# Patient Record
Sex: MALE | Race: WHITE | NOT HISPANIC OR LATINO | Employment: OTHER | URBAN - METROPOLITAN AREA
[De-identification: names, ages, dates, MRNs, and addresses within clinical notes are randomized per-mention and may not be internally consistent; named-entity substitution may affect disease eponyms.]

---

## 2018-06-15 ENCOUNTER — TELEPHONE (OUTPATIENT)
Dept: PRIMARY CARE CLINIC | Facility: CLINIC | Age: 58
End: 2018-06-15

## 2018-06-15 NOTE — TELEPHONE ENCOUNTER
----- Message from Shu Mantilla sent at 6/15/2018 12:46 PM CDT -----  Type: Requesting to speak with nurse    Name of Caller: Patient  Symptoms:  Spider bites  Best Call Back Number:  [phone]  Additional Information:   Patient requesting to speak with nurse concerning today's 3:00 scheduled appointment/has question/please call back to advise.

## 2018-06-22 ENCOUNTER — OFFICE VISIT (OUTPATIENT)
Dept: PRIMARY CARE CLINIC | Facility: CLINIC | Age: 58
End: 2018-06-22
Payer: MEDICARE

## 2018-06-22 VITALS
HEIGHT: 62 IN | OXYGEN SATURATION: 93 % | HEART RATE: 82 BPM | WEIGHT: 163.31 LBS | TEMPERATURE: 99 F | BODY MASS INDEX: 30.05 KG/M2 | RESPIRATION RATE: 18 BRPM | DIASTOLIC BLOOD PRESSURE: 85 MMHG | SYSTOLIC BLOOD PRESSURE: 157 MMHG

## 2018-06-22 DIAGNOSIS — J01.90 ACUTE NON-RECURRENT SINUSITIS, UNSPECIFIED LOCATION: Primary | ICD-10-CM

## 2018-06-22 DIAGNOSIS — J40 BRONCHITIS: ICD-10-CM

## 2018-06-22 PROCEDURE — 99213 OFFICE O/P EST LOW 20 MIN: CPT | Mod: S$PBB,,, | Performed by: INTERNAL MEDICINE

## 2018-06-22 PROCEDURE — 99213 OFFICE O/P EST LOW 20 MIN: CPT | Mod: PBBFAC,PN | Performed by: INTERNAL MEDICINE

## 2018-06-22 PROCEDURE — 99999 PR PBB SHADOW E&M-EST. PATIENT-LVL III: CPT | Mod: PBBFAC,,, | Performed by: INTERNAL MEDICINE

## 2018-06-22 RX ORDER — PROMETHAZINE HYDROCHLORIDE AND CODEINE PHOSPHATE 6.25; 1 MG/5ML; MG/5ML
5 SOLUTION ORAL EVERY 6 HOURS PRN
Qty: 120 ML | Refills: 0 | Status: SHIPPED | OUTPATIENT
Start: 2018-06-22 | End: 2018-07-02

## 2018-06-22 RX ORDER — DIAZEPAM 10 MG/1
10 TABLET ORAL 3 TIMES DAILY
Refills: 5 | COMMUNITY
Start: 2018-06-11

## 2018-06-22 RX ORDER — PREDNISONE 20 MG/1
20 TABLET ORAL 2 TIMES DAILY
Qty: 14 TABLET | Refills: 0 | Status: SHIPPED | OUTPATIENT
Start: 2018-06-22 | End: 2018-06-29

## 2018-06-22 RX ORDER — HYDROCODONE BITARTRATE AND ACETAMINOPHEN 5; 325 MG/1; MG/1
1 TABLET ORAL EVERY 6 HOURS PRN
Refills: 0 | COMMUNITY
Start: 2018-05-29 | End: 2018-07-18

## 2018-06-22 RX ORDER — AZITHROMYCIN 250 MG/1
TABLET, FILM COATED ORAL
Qty: 6 TABLET | Refills: 0 | Status: SHIPPED | OUTPATIENT
Start: 2018-06-22 | End: 2018-06-27

## 2018-06-22 RX ORDER — OXYCODONE AND ACETAMINOPHEN 10; 325 MG/1; MG/1
1 TABLET ORAL 3 TIMES DAILY
Refills: 0 | COMMUNITY
Start: 2018-06-19 | End: 2018-08-08

## 2018-06-24 NOTE — PROGRESS NOTES
Subjective:       Patient ID: Mike Craft is a 58 y.o. male.    Chief Complaint: Cough    HPI  Pt is here for bad cold coughing congesti sorethroat greenish phlegm has PCP for other med conditions and health maintenance take care of mom who is sick 24/7 at [facility] chronic back pain anxiety  Review of Systems    Objective:      Physical Exam   Constitutional: He is oriented to person, place, and time. He appears well-developed and well-nourished. No distress.   HENT:   Head: Normocephalic and atraumatic.   Right Ear: External ear normal.   Left Ear: External ear normal.   Mouth/Throat: Oropharynx is clear and moist. No oropharyngeal exudate.   Nasal congestion   Eyes: Conjunctivae and EOM are normal. Pupils are equal, round, and reactive to light. Right eye exhibits no discharge. Left eye exhibits no discharge.   Neck: Normal range of motion. Neck supple. No thyromegaly present.   Cardiovascular: Normal rate, regular rhythm, normal heart sounds and intact distal pulses.  Exam reveals no gallop and no friction rub.    No murmur heard.  Pulmonary/Chest: Effort normal. No respiratory distress. He has no wheezes. He has rales (mild bilateral ronchi). He exhibits no tenderness.   Abdominal: Soft. Bowel sounds are normal. He exhibits no distension. There is no tenderness. There is no rebound and no guarding.   Musculoskeletal: Normal range of motion. He exhibits no edema, tenderness or deformity.   Lymphadenopathy:     He has no cervical adenopathy.   Neurological: He is alert and oriented to person, place, and time.   Skin: Skin is warm and dry. Capillary refill takes less than 2 seconds. No rash noted. No erythema.   Psychiatric: He has a normal mood and affect. Judgment and thought content normal.   Nursing note and vitals reviewed.      Assessment:       1. Acute non-recurrent sinusitis, unspecified location    2. Bronchitis        Plan:       Acute non-recurrent sinusitis, unspecified location  -      azithromycin (Z-LIAT) 250 MG tablet; Take 2 tablets by mouth on day 1; Take 1 tablet by mouth on days 2-5  Dispense: 6 tablet; Refill: 0  -     predniSONE (DELTASONE) 20 MG tablet; Take 1 tablet (20 mg total) by mouth 2 (two) times daily. for 7 days  Dispense: 14 tablet; Refill: 0  -     promethazine-codeine 6.25-10 mg/5 ml (PHENERGAN WITH CODEINE) 6.25-10 mg/5 mL syrup; Take 5 mLs by mouth every 6 (six) hours as needed for Cough.  Dispense: 120 mL; Refill: 0    Bronchitis  -     azithromycin (Z-LIAT) 250 MG tablet; Take 2 tablets by mouth on day 1; Take 1 tablet by mouth on days 2-5  Dispense: 6 tablet; Refill: 0  -     predniSONE (DELTASONE) 20 MG tablet; Take 1 tablet (20 mg total) by mouth 2 (two) times daily. for 7 days  Dispense: 14 tablet; Refill: 0  -     promethazine-codeine 6.25-10 mg/5 ml (PHENERGAN WITH CODEINE) 6.25-10 mg/5 mL syrup; Take 5 mLs by mouth every 6 (six) hours as needed for Cough.  Dispense: 120 mL; Refill: 0

## 2018-07-24 PROBLEM — K22.70 BARRETT'S ESOPHAGUS: Status: ACTIVE | Noted: 2018-07-24

## 2018-11-08 ENCOUNTER — OFFICE VISIT (OUTPATIENT)
Dept: PRIMARY CARE CLINIC | Facility: CLINIC | Age: 58
End: 2018-11-08
Payer: MEDICARE

## 2018-11-08 VITALS
DIASTOLIC BLOOD PRESSURE: 76 MMHG | SYSTOLIC BLOOD PRESSURE: 132 MMHG | HEIGHT: 61 IN | RESPIRATION RATE: 18 BRPM | HEART RATE: 83 BPM | BODY MASS INDEX: 30.58 KG/M2 | WEIGHT: 162 LBS | OXYGEN SATURATION: 99 %

## 2018-11-08 DIAGNOSIS — K04.7 ABSCESSED TOOTH: Primary | ICD-10-CM

## 2018-11-08 DIAGNOSIS — J01.90 ACUTE NON-RECURRENT SINUSITIS, UNSPECIFIED LOCATION: ICD-10-CM

## 2018-11-08 DIAGNOSIS — R51.9 NONINTRACTABLE HEADACHE, UNSPECIFIED CHRONICITY PATTERN, UNSPECIFIED HEADACHE TYPE: ICD-10-CM

## 2018-11-08 PROCEDURE — 99213 OFFICE O/P EST LOW 20 MIN: CPT | Mod: S$PBB,,, | Performed by: INTERNAL MEDICINE

## 2018-11-08 PROCEDURE — 99213 OFFICE O/P EST LOW 20 MIN: CPT | Mod: PBBFAC,PN | Performed by: INTERNAL MEDICINE

## 2018-11-08 PROCEDURE — 99999 PR PBB SHADOW E&M-EST. PATIENT-LVL III: CPT | Mod: PBBFAC,,, | Performed by: INTERNAL MEDICINE

## 2018-11-08 RX ORDER — BUTALBITAL, ACETAMINOPHEN AND CAFFEINE 50; 325; 40 MG/1; MG/1; MG/1
1 TABLET ORAL EVERY 6 HOURS PRN
Qty: 40 TABLET | Refills: 0 | Status: SHIPPED | OUTPATIENT
Start: 2018-11-08 | End: 2018-12-08

## 2018-11-09 NOTE — PATIENT INSTRUCTIONS
Keep appointment with his dentist next week can see the clindamycin if the omentum not effective for tooth abscess

## 2018-11-09 NOTE — PROGRESS NOTES
Subjective:       Patient ID: Mike Craft is a 58 y.o. male.    Chief Complaint: Hospital Follow Up (teeth ) and Headache    HPI  patient was seen in ER few days ago for tooth abscess severe pain on right face given IV morphine and p.o. antibiotic Augmentin to take since last night fill feeling better but right face still with swelling erythematous in the lower eyelid spongy patient complained of migraine headaches on the right side request refills on his of his head  Review of Systems    Objective:      Physical Exam   Constitutional: He is oriented to person, place, and time. He appears well-developed and well-nourished. No distress.   HENT:   Head: Normocephalic and atraumatic.   Right Ear: External ear normal.   Left Ear: External ear normal.   Nose: Nose normal.   Mouth/Throat: Oropharynx is clear and moist. No oropharyngeal exudate.   Eyes: Conjunctivae and EOM are normal. Pupils are equal, round, and reactive to light. Right eye exhibits no discharge. Left eye exhibits no discharge.   Neck: Normal range of motion. Neck supple. No thyromegaly present.   Cardiovascular: Normal rate, regular rhythm, normal heart sounds and intact distal pulses. Exam reveals no gallop and no friction rub.   No murmur heard.  Pulmonary/Chest: Effort normal and breath sounds normal. No respiratory distress. He has no wheezes. He has no rales. He exhibits no tenderness.   Abdominal: Soft. Bowel sounds are normal. He exhibits no distension. There is no tenderness. There is no rebound and no guarding.   Musculoskeletal: Normal range of motion. He exhibits no edema, tenderness or deformity.   Lymphadenopathy:     He has no cervical adenopathy.   Neurological: He is alert and oriented to person, place, and time.   Skin: Skin is warm and dry. Capillary refill takes less than 2 seconds. No rash noted. No erythema.   Tenderness and swelling of the right maxillary sinuses and right lower eyelid   Psychiatric: He has a normal mood and  affect. Judgment and thought content normal.   Nursing note and vitals reviewed.      Assessment:       1. Abscessed tooth    2. Nonintractable headache, unspecified chronicity pattern, unspecified headache type    3. Acute non-recurrent sinusitis, unspecified location        Plan:       Abscessed tooth    Nonintractable headache, unspecified chronicity pattern, unspecified headache type  -     butalbital-acetaminophen-caffeine -40 mg (FIORICET, ESGIC) -40 mg per tablet; Take 1 tablet by mouth every 6 (six) hours as needed for Pain.  Dispense: 40 tablet; Refill: 0    Acute non-recurrent sinusitis, unspecified location

## 2018-12-21 PROBLEM — K21.00 ESOPHAGITIS, REFLUX: Status: ACTIVE | Noted: 2018-12-21

## 2019-01-31 ENCOUNTER — OFFICE VISIT (OUTPATIENT)
Dept: PRIMARY CARE CLINIC | Facility: CLINIC | Age: 59
End: 2019-01-31
Payer: MEDICARE

## 2019-01-31 VITALS
HEART RATE: 75 BPM | TEMPERATURE: 98 F | RESPIRATION RATE: 18 BRPM | DIASTOLIC BLOOD PRESSURE: 84 MMHG | OXYGEN SATURATION: 96 % | WEIGHT: 166.19 LBS | BODY MASS INDEX: 30.58 KG/M2 | SYSTOLIC BLOOD PRESSURE: 149 MMHG | HEIGHT: 62 IN

## 2019-01-31 DIAGNOSIS — J06.9 UPPER RESPIRATORY TRACT INFECTION, UNSPECIFIED TYPE: Primary | ICD-10-CM

## 2019-01-31 DIAGNOSIS — R05.9 COUGH: ICD-10-CM

## 2019-01-31 PROCEDURE — 99024 POSTOP FOLLOW-UP VISIT: CPT | Mod: POP,,, | Performed by: INTERNAL MEDICINE

## 2019-01-31 PROCEDURE — 99211 PR OFFICE/OUTPT VISIT, EST, LEVL I: ICD-10-PCS | Mod: S$PBB,,, | Performed by: INTERNAL MEDICINE

## 2019-01-31 PROCEDURE — 99999 PR PBB SHADOW E&M-EST. PATIENT-LVL III: CPT | Mod: PBBFAC,,, | Performed by: INTERNAL MEDICINE

## 2019-01-31 PROCEDURE — 99999 PR PBB SHADOW E&M-EST. PATIENT-LVL III: ICD-10-PCS | Mod: PBBFAC,,, | Performed by: INTERNAL MEDICINE

## 2019-01-31 PROCEDURE — 99024 PR POST-OP FOLLOW-UP VISIT: ICD-10-PCS | Mod: POP,,, | Performed by: INTERNAL MEDICINE

## 2019-01-31 PROCEDURE — 99211 OFF/OP EST MAY X REQ PHY/QHP: CPT | Mod: S$PBB,,, | Performed by: INTERNAL MEDICINE

## 2019-01-31 PROCEDURE — 99213 OFFICE O/P EST LOW 20 MIN: CPT | Mod: PBBFAC,PN | Performed by: INTERNAL MEDICINE

## 2022-06-29 PROBLEM — K85.90 PANCREATITIS: Status: ACTIVE | Noted: 2022-06-29

## 2022-06-29 PROBLEM — R10.9 ABDOMINAL PAIN: Status: ACTIVE | Noted: 2022-06-29

## 2022-07-04 ENCOUNTER — HOSPITAL ENCOUNTER (OUTPATIENT)
Facility: HOSPITAL | Age: 62
Discharge: HOME | End: 2022-07-05
Attending: EMERGENCY MEDICINE | Admitting: STUDENT IN AN ORGANIZED HEALTH CARE EDUCATION/TRAINING PROGRAM
Payer: MEDICARE

## 2022-07-04 DIAGNOSIS — R07.9 CHEST PAIN: ICD-10-CM

## 2022-07-04 DIAGNOSIS — R10.9 ABDOMINAL PAIN: ICD-10-CM

## 2022-07-04 PROBLEM — F14.10 COCAINE ABUSE: Status: ACTIVE | Noted: 2022-07-04

## 2022-07-04 PROBLEM — F10.20 ALCOHOLISM: Status: ACTIVE | Noted: 2022-07-04

## 2022-07-04 PROBLEM — I10 HYPERTENSION: Status: ACTIVE | Noted: 2020-05-14

## 2022-07-04 PROBLEM — B19.20 HEPATITIS C VIRUS INFECTION: Status: ACTIVE | Noted: 2020-05-14

## 2022-07-04 LAB
ALBUMIN SERPL BCP-MCNC: 3.9 G/DL (ref 3.5–5.2)
ALP SERPL-CCNC: 61 U/L (ref 55–135)
ALT SERPL W/O P-5'-P-CCNC: 48 U/L (ref 10–44)
ANION GAP SERPL CALC-SCNC: 8 MMOL/L (ref 8–16)
AST SERPL-CCNC: 17 U/L (ref 10–40)
BASOPHILS # BLD AUTO: 0.12 K/UL (ref 0–0.2)
BASOPHILS NFR BLD: 0.6 % (ref 0–1.9)
BILIRUB SERPL-MCNC: 0.4 MG/DL (ref 0.1–1)
BILIRUB UR QL STRIP: NEGATIVE
BUN SERPL-MCNC: 21 MG/DL (ref 8–23)
CALCIUM SERPL-MCNC: 9.2 MG/DL (ref 8.7–10.5)
CHLORIDE SERPL-SCNC: 107 MMOL/L (ref 95–110)
CLARITY UR REFRACT.AUTO: CLEAR
CO2 SERPL-SCNC: 24 MMOL/L (ref 23–29)
COLOR UR AUTO: YELLOW
CREAT SERPL-MCNC: 1.1 MG/DL (ref 0.5–1.4)
DIFFERENTIAL METHOD: ABNORMAL
EOSINOPHIL # BLD AUTO: 0 K/UL (ref 0–0.5)
EOSINOPHIL NFR BLD: 0 % (ref 0–8)
ERYTHROCYTE [DISTWIDTH] IN BLOOD BY AUTOMATED COUNT: 13.4 % (ref 11.5–14.5)
EST. GFR  (AFRICAN AMERICAN): >60 ML/MIN/1.73 M^2
EST. GFR  (NON AFRICAN AMERICAN): >60 ML/MIN/1.73 M^2
GLUCOSE SERPL-MCNC: 107 MG/DL (ref 70–110)
GLUCOSE UR QL STRIP: NEGATIVE
HCT VFR BLD AUTO: 52.2 % (ref 40–54)
HGB BLD-MCNC: 16.9 G/DL (ref 14–18)
HGB UR QL STRIP: ABNORMAL
IMM GRANULOCYTES # BLD AUTO: 0.71 K/UL (ref 0–0.04)
IMM GRANULOCYTES NFR BLD AUTO: 3.5 % (ref 0–0.5)
KETONES UR QL STRIP: NEGATIVE
LACTATE SERPL-SCNC: 1.7 MMOL/L (ref 0.5–2.2)
LEUKOCYTE ESTERASE UR QL STRIP: NEGATIVE
LIPASE SERPL-CCNC: 51 U/L (ref 4–60)
LYMPHOCYTES # BLD AUTO: 1.8 K/UL (ref 1–4.8)
LYMPHOCYTES NFR BLD: 9.1 % (ref 18–48)
MCH RBC QN AUTO: 28.7 PG (ref 27–31)
MCHC RBC AUTO-ENTMCNC: 32.4 G/DL (ref 32–36)
MCV RBC AUTO: 89 FL (ref 82–98)
MICROSCOPIC COMMENT: NORMAL
MONOCYTES # BLD AUTO: 1.1 K/UL (ref 0.3–1)
MONOCYTES NFR BLD: 5.3 % (ref 4–15)
NEUTROPHILS # BLD AUTO: 16.5 K/UL (ref 1.8–7.7)
NEUTROPHILS NFR BLD: 81.5 % (ref 38–73)
NITRITE UR QL STRIP: NEGATIVE
NRBC BLD-RTO: 0 /100 WBC
PH UR STRIP: 5 [PH] (ref 5–8)
PLATELET # BLD AUTO: 288 K/UL (ref 150–450)
PMV BLD AUTO: 10.4 FL (ref 9.2–12.9)
POTASSIUM SERPL-SCNC: 4.9 MMOL/L (ref 3.5–5.1)
PROT SERPL-MCNC: 7.1 G/DL (ref 6–8.4)
PROT UR QL STRIP: NEGATIVE
RBC # BLD AUTO: 5.88 M/UL (ref 4.6–6.2)
RBC #/AREA URNS AUTO: 1 /HPF (ref 0–4)
SODIUM SERPL-SCNC: 139 MMOL/L (ref 136–145)
SP GR UR STRIP: 1.02 (ref 1–1.03)
URN SPEC COLLECT METH UR: ABNORMAL
WBC # BLD AUTO: 20.28 K/UL (ref 3.9–12.7)
WBC #/AREA URNS AUTO: 0 /HPF (ref 0–5)

## 2022-07-04 PROCEDURE — 96372 THER/PROPH/DIAG INJ SC/IM: CPT | Mod: 59

## 2022-07-04 PROCEDURE — 81001 URINALYSIS AUTO W/SCOPE: CPT | Performed by: PHYSICIAN ASSISTANT

## 2022-07-04 PROCEDURE — 99285 EMERGENCY DEPT VISIT HI MDM: CPT | Mod: 25

## 2022-07-04 PROCEDURE — 87040 BLOOD CULTURE FOR BACTERIA: CPT | Mod: 59 | Performed by: STUDENT IN AN ORGANIZED HEALTH CARE EDUCATION/TRAINING PROGRAM

## 2022-07-04 PROCEDURE — 63600175 PHARM REV CODE 636 W HCPCS: Performed by: STUDENT IN AN ORGANIZED HEALTH CARE EDUCATION/TRAINING PROGRAM

## 2022-07-04 PROCEDURE — 25000003 PHARM REV CODE 250

## 2022-07-04 PROCEDURE — 85025 COMPLETE CBC W/AUTO DIFF WBC: CPT | Mod: 91 | Performed by: PHYSICIAN ASSISTANT

## 2022-07-04 PROCEDURE — S4991 NICOTINE PATCH NONLEGEND: HCPCS | Performed by: STUDENT IN AN ORGANIZED HEALTH CARE EDUCATION/TRAINING PROGRAM

## 2022-07-04 PROCEDURE — 83605 ASSAY OF LACTIC ACID: CPT | Performed by: PHYSICIAN ASSISTANT

## 2022-07-04 PROCEDURE — 99285 PR EMERGENCY DEPT VISIT,LEVEL V: ICD-10-PCS | Mod: ,,, | Performed by: PHYSICIAN ASSISTANT

## 2022-07-04 PROCEDURE — 96375 TX/PRO/DX INJ NEW DRUG ADDON: CPT

## 2022-07-04 PROCEDURE — 25500020 PHARM REV CODE 255: Performed by: STUDENT IN AN ORGANIZED HEALTH CARE EDUCATION/TRAINING PROGRAM

## 2022-07-04 PROCEDURE — G0378 HOSPITAL OBSERVATION PER HR: HCPCS

## 2022-07-04 PROCEDURE — 63600175 PHARM REV CODE 636 W HCPCS: Performed by: PHYSICIAN ASSISTANT

## 2022-07-04 PROCEDURE — 99223 1ST HOSP IP/OBS HIGH 75: CPT | Mod: AI,GC,, | Performed by: STUDENT IN AN ORGANIZED HEALTH CARE EDUCATION/TRAINING PROGRAM

## 2022-07-04 PROCEDURE — 25000003 PHARM REV CODE 250: Performed by: PHYSICIAN ASSISTANT

## 2022-07-04 PROCEDURE — 80053 COMPREHEN METABOLIC PANEL: CPT | Mod: 91 | Performed by: PHYSICIAN ASSISTANT

## 2022-07-04 PROCEDURE — 83690 ASSAY OF LIPASE: CPT | Mod: 91 | Performed by: PHYSICIAN ASSISTANT

## 2022-07-04 PROCEDURE — 96376 TX/PRO/DX INJ SAME DRUG ADON: CPT

## 2022-07-04 PROCEDURE — 63600175 PHARM REV CODE 636 W HCPCS

## 2022-07-04 PROCEDURE — 96365 THER/PROPH/DIAG IV INF INIT: CPT

## 2022-07-04 PROCEDURE — 99223 PR INITIAL HOSPITAL CARE,LEVL III: ICD-10-PCS | Mod: AI,GC,, | Performed by: STUDENT IN AN ORGANIZED HEALTH CARE EDUCATION/TRAINING PROGRAM

## 2022-07-04 PROCEDURE — 96361 HYDRATE IV INFUSION ADD-ON: CPT

## 2022-07-04 PROCEDURE — 99285 EMERGENCY DEPT VISIT HI MDM: CPT | Mod: ,,, | Performed by: PHYSICIAN ASSISTANT

## 2022-07-04 PROCEDURE — 25000003 PHARM REV CODE 250: Performed by: STUDENT IN AN ORGANIZED HEALTH CARE EDUCATION/TRAINING PROGRAM

## 2022-07-04 RX ORDER — AMLODIPINE BESYLATE 10 MG/1
10 TABLET ORAL DAILY
Status: DISCONTINUED | OUTPATIENT
Start: 2022-07-04 | End: 2022-07-04

## 2022-07-04 RX ORDER — HYDRALAZINE HYDROCHLORIDE 25 MG/1
25 TABLET, FILM COATED ORAL EVERY 6 HOURS PRN
Status: DISCONTINUED | OUTPATIENT
Start: 2022-07-04 | End: 2022-07-05 | Stop reason: HOSPADM

## 2022-07-04 RX ORDER — SODIUM CHLORIDE 0.9 % (FLUSH) 0.9 %
10 SYRINGE (ML) INJECTION EVERY 12 HOURS PRN
Status: DISCONTINUED | OUTPATIENT
Start: 2022-07-04 | End: 2022-07-05 | Stop reason: HOSPADM

## 2022-07-04 RX ORDER — OXYCODONE AND ACETAMINOPHEN 7.5; 325 MG/1; MG/1
1 TABLET ORAL EVERY 6 HOURS PRN
Status: DISCONTINUED | OUTPATIENT
Start: 2022-07-04 | End: 2022-07-05 | Stop reason: HOSPADM

## 2022-07-04 RX ORDER — TALC
6 POWDER (GRAM) TOPICAL NIGHTLY PRN
Status: DISCONTINUED | OUTPATIENT
Start: 2022-07-04 | End: 2022-07-05 | Stop reason: HOSPADM

## 2022-07-04 RX ORDER — IBUPROFEN 200 MG
16 TABLET ORAL
Status: DISCONTINUED | OUTPATIENT
Start: 2022-07-04 | End: 2022-07-05 | Stop reason: HOSPADM

## 2022-07-04 RX ORDER — TAMSULOSIN HYDROCHLORIDE 0.4 MG/1
0.4 CAPSULE ORAL DAILY
Status: DISCONTINUED | OUTPATIENT
Start: 2022-07-05 | End: 2022-07-05 | Stop reason: HOSPADM

## 2022-07-04 RX ORDER — DIPHENHYDRAMINE HYDROCHLORIDE 50 MG/ML
50 INJECTION INTRAMUSCULAR; INTRAVENOUS ONCE
Status: COMPLETED | OUTPATIENT
Start: 2022-07-04 | End: 2022-07-04

## 2022-07-04 RX ORDER — MORPHINE SULFATE 4 MG/ML
4 INJECTION, SOLUTION INTRAMUSCULAR; INTRAVENOUS
Status: COMPLETED | OUTPATIENT
Start: 2022-07-04 | End: 2022-07-04

## 2022-07-04 RX ORDER — DIAZEPAM 5 MG/1
10 TABLET ORAL EVERY 6 HOURS PRN
Status: DISCONTINUED | OUTPATIENT
Start: 2022-07-04 | End: 2022-07-05 | Stop reason: HOSPADM

## 2022-07-04 RX ORDER — PANTOPRAZOLE SODIUM 40 MG/1
40 TABLET, DELAYED RELEASE ORAL DAILY
Status: DISCONTINUED | OUTPATIENT
Start: 2022-07-05 | End: 2022-07-05 | Stop reason: HOSPADM

## 2022-07-04 RX ORDER — DIPHENHYDRAMINE HCL 50 MG
50 CAPSULE ORAL ONCE AS NEEDED
Status: DISCONTINUED | OUTPATIENT
Start: 2022-07-04 | End: 2022-07-05 | Stop reason: HOSPADM

## 2022-07-04 RX ORDER — GLUCAGON 1 MG
1 KIT INJECTION
Status: DISCONTINUED | OUTPATIENT
Start: 2022-07-04 | End: 2022-07-05 | Stop reason: HOSPADM

## 2022-07-04 RX ORDER — SUCRALFATE 1 G/10ML
1 SUSPENSION ORAL EVERY 6 HOURS
Status: DISCONTINUED | OUTPATIENT
Start: 2022-07-04 | End: 2022-07-05 | Stop reason: HOSPADM

## 2022-07-04 RX ORDER — AMLODIPINE BESYLATE 10 MG/1
10 TABLET ORAL DAILY
Status: DISCONTINUED | OUTPATIENT
Start: 2022-07-05 | End: 2022-07-04

## 2022-07-04 RX ORDER — ONDANSETRON 2 MG/ML
4 INJECTION INTRAMUSCULAR; INTRAVENOUS EVERY 8 HOURS PRN
Status: DISCONTINUED | OUTPATIENT
Start: 2022-07-04 | End: 2022-07-05 | Stop reason: HOSPADM

## 2022-07-04 RX ORDER — NICOTINE 7MG/24HR
1 PATCH, TRANSDERMAL 24 HOURS TRANSDERMAL DAILY
Status: DISCONTINUED | OUTPATIENT
Start: 2022-07-04 | End: 2022-07-05 | Stop reason: HOSPADM

## 2022-07-04 RX ORDER — NALOXONE HCL 0.4 MG/ML
0.02 VIAL (ML) INJECTION
Status: DISCONTINUED | OUTPATIENT
Start: 2022-07-04 | End: 2022-07-05 | Stop reason: HOSPADM

## 2022-07-04 RX ORDER — ONDANSETRON 2 MG/ML
4 INJECTION INTRAMUSCULAR; INTRAVENOUS
Status: COMPLETED | OUTPATIENT
Start: 2022-07-04 | End: 2022-07-04

## 2022-07-04 RX ORDER — ENOXAPARIN SODIUM 100 MG/ML
40 INJECTION SUBCUTANEOUS EVERY 24 HOURS
Status: DISCONTINUED | OUTPATIENT
Start: 2022-07-04 | End: 2022-07-05 | Stop reason: HOSPADM

## 2022-07-04 RX ORDER — MAG HYDROX/ALUMINUM HYD/SIMETH 200-200-20
30 SUSPENSION, ORAL (FINAL DOSE FORM) ORAL
Status: DISCONTINUED | OUTPATIENT
Start: 2022-07-04 | End: 2022-07-05 | Stop reason: HOSPADM

## 2022-07-04 RX ORDER — NIFEDIPINE 30 MG/1
90 TABLET, EXTENDED RELEASE ORAL DAILY
Status: DISCONTINUED | OUTPATIENT
Start: 2022-07-04 | End: 2022-07-05 | Stop reason: HOSPADM

## 2022-07-04 RX ORDER — IBUPROFEN 200 MG
24 TABLET ORAL
Status: DISCONTINUED | OUTPATIENT
Start: 2022-07-04 | End: 2022-07-05 | Stop reason: HOSPADM

## 2022-07-04 RX ORDER — NICOTINE 7MG/24HR
1 PATCH, TRANSDERMAL 24 HOURS TRANSDERMAL DAILY
Status: DISCONTINUED | OUTPATIENT
Start: 2022-07-05 | End: 2022-07-04

## 2022-07-04 RX ADMIN — SUCRALFATE 1 G: 1 SUSPENSION ORAL at 06:07

## 2022-07-04 RX ADMIN — ENOXAPARIN SODIUM 40 MG: 100 INJECTION SUBCUTANEOUS at 06:07

## 2022-07-04 RX ADMIN — MORPHINE SULFATE 4 MG: 4 INJECTION INTRAVENOUS at 11:07

## 2022-07-04 RX ADMIN — PIPERACILLIN SODIUM AND TAZOBACTAM SODIUM 4.5 G: 4; .5 INJECTION, POWDER, LYOPHILIZED, FOR SOLUTION INTRAVENOUS at 05:07

## 2022-07-04 RX ADMIN — DIPHENHYDRAMINE HYDROCHLORIDE 50 MG: 50 INJECTION, SOLUTION INTRAMUSCULAR; INTRAVENOUS at 09:07

## 2022-07-04 RX ADMIN — MORPHINE SULFATE 4 MG: 4 INJECTION INTRAVENOUS at 12:07

## 2022-07-04 RX ADMIN — OXYCODONE AND ACETAMINOPHEN 1 TABLET: 7.5; 325 TABLET ORAL at 07:07

## 2022-07-04 RX ADMIN — NIFEDIPINE 90 MG: 30 TABLET, FILM COATED, EXTENDED RELEASE ORAL at 06:07

## 2022-07-04 RX ADMIN — ONDANSETRON 4 MG: 2 INJECTION INTRAMUSCULAR; INTRAVENOUS at 11:07

## 2022-07-04 RX ADMIN — NICOTINE 1 PATCH: 7 PATCH TRANSDERMAL at 06:07

## 2022-07-04 RX ADMIN — HYDRALAZINE HYDROCHLORIDE 25 MG: 25 TABLET, FILM COATED ORAL at 06:07

## 2022-07-04 RX ADMIN — IOHEXOL 100 ML: 350 INJECTION, SOLUTION INTRAVENOUS at 10:07

## 2022-07-04 RX ADMIN — SODIUM CHLORIDE 1000 ML: 0.9 INJECTION, SOLUTION INTRAVENOUS at 11:07

## 2022-07-04 RX ADMIN — MORPHINE SULFATE 4 MG: 4 INJECTION INTRAVENOUS at 04:07

## 2022-07-04 NOTE — ED NOTES
"Pt is AAOx4. Pt states he has "a lesion on his pancreas." Pt reports leaving [facility] a couple of hours ago d/t not receiving pain medications and "hopped in an ambulance" and [place] [facility]. Pt states he's having a "biospy on Thursday and hopes he can be admitted today to save him the trip."    Patient identifiers for Mike Craft 62 y.o. male checked and correct.  Chief Complaint   Patient presents with    Abdominal Pain     Discharged from [facility] and is not happy with Rx pain medication.      Past Medical History:   Diagnosis Date    Anxiety     Craft's esophagus 2018    Bronchitis     Chronic pain     back and neck    Cocaine abuse     Depression     GERD (gastroesophageal reflux disease)     History of stomach ulcers     Hypertension     Kidney stones     Sinusitis      Allergies reported:   Review of patient's allergies indicates:   Allergen Reactions    Iodinated contrast media Hives    Contrast media        "

## 2022-07-04 NOTE — HPI
62-year-old male with history of hypertension, kidney stones, and chronic vague abdominal pain who presents with acute abdominal pain for 5 days. Patient notes the episodes are intermittent and unpredictable, sometimes waking him from his sleep. The pain will often radiate into his testicles and is associated with nausea and vomiting.  He was recently see for the same on 06/29/22 and diagnosed with pancreatitis and pyelonephritis and was given abx for this. CT at that time also showed a 3.1 hypoechoic mass in pancreas near uncinate for which he had GI follow up for. Of note, this was also seen on CT in 2014. Denies fevers, chills, chest pain, or any difficulty breathing.     At time of exam patient was found sleeping in no distress.

## 2022-07-04 NOTE — ED NOTES
ED Physician Hand-off Note:    ED Course: I assumed care of patient from off-going ED physician team. Briefly, Patient is a 62-year-old male with intermittent abdominal pain    At the time of signout plan was pending General surgery evaluation.    Medications given in the ED:    Medications   sodium chloride 0.9% bolus 1,000 mL (0 mLs Intravenous Stopped 7/4/22 1231)   morphine injection 4 mg (4 mg Intravenous Given 7/4/22 1115)   ondansetron injection 4 mg (4 mg Intravenous Given 7/4/22 1106)   morphine injection 4 mg (4 mg Intravenous Given 7/4/22 1228)   morphine injection 4 mg (4 mg Intravenous Given 7/4/22 1601)   piperacillin-tazobactam 4.5 g in sodium chloride 0.9% 100 mL IVPB (ready to mix system) (4.5 g Intravenous New Bag 7/4/22 1705)       Disposition:  Discussed plan with off going provider.  Plan to admit for intractable abdominal pain as well as worsening leukocytosis.  Discussed with general surgery who does not feel that this is biliary and does not recommend any surgical intervention.  Will continue with previous plan and admit to hospital medicine for further evaluation.    Patient comfortable with admission. Patient counseled regarding exam, results, diagnosis, treatment, and plan.    Impression:  Abdominal pain       Shayy Lobato PA-C  07/04/22 [id number]

## 2022-07-04 NOTE — Clinical Note
Diagnosis: Abdominal pain [633706]   Future Attending Provider: PRABHA BUTT [[id number]]   Admitting Provider:: PRABHA BUTT [[id number]]

## 2022-07-04 NOTE — ED PROVIDER NOTES
Encounter Date: 7/4/2022       History     Chief Complaint   Patient presents with    Abdominal Pain     Discharged from [facility] and is not happy with Rx pain medication.      This is a 62 y.o. year old male with a PMH of HTN, kidney stones, GERD who presents to the ED with a chief complaint of abdominal pain. Patient reports intermittent abdominal pain x 5 days. The current episode woke him up out of his sleep. The pain is described as mid abdominal squeezing pain with radiation to the back and testicles. Patient rates the pain 10/10. Associated symptoms include nausea and vomiting. He was seen at [facility] and did not improve, so he called EMS for transport here. He was admitted with pancreatitis and pyelonephritis on 6/29. Urine culture from that admission showed no growth. Lipase peaked at 139 and started trending down. CT and abdominal US showed an Ill-defined 3.1 cm hypoechoic mass in the pancreatic uncinate. Tiny gallbladder polyps versus adherent stones were also noted. He denies fever, chills, nasal congestion, cough, chest pain, shortness of breath. Surgical hx appendectomy. Patient denies known exposure to COVID-19 and is vaccinated.          Review of patient's allergies indicates:   Allergen Reactions    Iodinated contrast media Hives    Contrast media      Past Medical History:   Diagnosis Date    Anxiety     Craft's esophagus 2018    Bronchitis     Chronic pain     back and neck    Cocaine abuse     Depression     GERD (gastroesophageal reflux disease)     History of stomach ulcers     Hypertension     Kidney stones     Sinusitis      Past Surgical History:   Procedure Laterality Date    APPENDECTOMY      CERVICAL FUSION      titanium plate in neck c 4 5    ESOPHAGOGASTRODUODENOSCOPY N/A 7/24/2018    Procedure: EGD (ESOPHAGOGASTRODUODENOSCOPY);  Surgeon: Justo Barrow MD;  [facility];  Service: General;  Laterality: N/A;    ESOPHAGOGASTRODUODENOSCOPY  12/21/2018     ESOPHAGOGASTRODUODENOSCOPY N/A 12/21/2018    Procedure: EGD (ESOPHAGOGASTRODUODENOSCOPY);  Surgeon: Justo Barrow MD;  Location[facility];  Service: General;  Laterality: N/A;    FINGER TENDON REPAIR      lil finger lt hand    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     Family History   Problem Relation Age of Onset    Parkinsonism Mother     Alzheimer's disease Mother     Hypertension Mother     Hyperlipidemia Mother     Cancer Father         lung ca    Kidney disease Brother      Social History     Tobacco Use    Smoking status: Current Every Day Smoker     Packs/day: 1.00     Years: 30.00     Pack years: 30.00     Types: Cigarettes    Smokeless tobacco: Never Used   Substance Use Topics    Alcohol use: Not Currently     Alcohol/week: 1.0 standard drink     Types: 1 Cans of beer per week     Comment: six pack per weekend    Drug use: Not Currently     Review of Systems   Constitutional: Negative for chills and fever.   HENT: Negative for sore throat.    Respiratory: Negative for shortness of breath.    Cardiovascular: Negative for chest pain.   Gastrointestinal: Positive for abdominal pain, nausea and vomiting.   Genitourinary: Negative for dysuria.   Musculoskeletal: Positive for back pain.   Skin: Negative for rash.   Neurological: Negative for weakness.   Hematological: Does not bruise/bleed easily.       Physical Exam     Initial Vitals [07/04/22 1000]   BP Pulse Resp Temp SpO2   (!) 168/82 62 20 97.8 °F (36.6 °C) 97 %      MAP       --         Physical Exam    Constitutional: He appears well-developed and well-nourished. No distress.   HENT:   Head: Atraumatic.   Eyes: Conjunctivae and EOM are normal. Pupils are equal, round, and reactive to light.   Cardiovascular: Normal rate, regular rhythm and normal heart sounds.   Pulmonary/Chest: Breath sounds normal. No respiratory distress. He has no wheezes. He has no rhonchi. He has no rales.   Abdominal: Abdomen is soft and protuberant. Bowel  sounds are normal. There is generalized abdominal tenderness. There is no rebound and no guarding.     Neurological: He is alert and oriented to person, place, and time.   Skin: Skin is warm and dry. No rash noted.         ED Course   Procedures  Labs Reviewed   CBC W/ AUTO DIFFERENTIAL - Abnormal; Notable for the following components:       Result Value    WBC 20.28 (*)     Immature Granulocytes 3.5 (*)     Gran # (ANC) 16.5 (*)     Immature Grans (Abs) 0.71 (*)     Mono # 1.1 (*)     Gran % 81.5 (*)     Lymph % 9.1 (*)     All other components within normal limits   COMPREHENSIVE METABOLIC PANEL - Abnormal; Notable for the following components:    ALT 48 (*)     All other components within normal limits   URINALYSIS, REFLEX TO URINE CULTURE - Abnormal; Notable for the following components:    Occult Blood UA 1+ (*)     All other components within normal limits    Narrative:     Specimen Source->Urine   LIPASE   URINALYSIS MICROSCOPIC    Narrative:     Specimen Source->Urine          Imaging Results          US Abdomen Limited (Gallbladder) (Final result)  Result time 07/04/22 12:43:47    Final result by Dani Davey MD (07/04/22 12:43:47)                 Impression:      No detrimental change or acute sonographic abnormality.    Grossly stable ill-defined 2.4 cm hypoechoic mass in the pancreatic uncinate, which is indeterminate but has been present on CT dating back to at least 2014.    Few additional findings as above.      Electronically signed by: Dani Davey MD  Date:    07/04/2022  Time:    12:43             Narrative:    EXAMINATION:  US ABDOMEN LIMITED    CLINICAL HISTORY:  Unspecified abdominal pain    TECHNIQUE:  Limited ultrasound of the right upper quadrant of the abdomen.    COMPARISON:  Gallbladder ultrasound 06/29/2022, CT renal stone study 06/29/2022 and 11/09/2014    FINDINGS:  Gallbladder: No calculi, wall thickening, or pericholecystic fluid.  No sonographic Galvan's sign.    Biliary system:  The common duct is not dilated, measuring 2 mm.  No intrahepatic ductal dilatation.    Miscellaneous: Redemonstration of ill-defined hypoechoic mass in the region of the uncinate process measuring approximately 2.1 x 2 x 2.4 cm, not significantly changed allowing for inter observer variability, likely corresponding to the soft tissue mass identified on previous CT.  No upper abdominal ascites.  No right hydronephrosis.  1.9 cm simple cortical cyst at the interpolar region of the right kidney.  7 mm simple parenchymal cyst within the left hepatic lobe.  Echogenic hepatic parenchyma which could reflect steatosis and/or chronic liver disease.                                 Medications   morphine injection 4 mg (has no administration in time range)   sodium chloride 0.9% bolus 1,000 mL (0 mLs Intravenous Stopped 7/4/22 1231)   morphine injection 4 mg (4 mg Intravenous Given 7/4/22 1115)   ondansetron injection 4 mg (4 mg Intravenous Given 7/4/22 1106)   morphine injection 4 mg (4 mg Intravenous Given 7/4/22 1228)     Medical Decision Making:   History:   Old Medical Records: I decided to obtain old medical records.  Old Records Summarized: records from previous admission(s).  Clinical Tests:   Lab Tests: Ordered and Reviewed  Radiological Study: Ordered and Reviewed  Medical Tests: Ordered and Reviewed       APC / Resident Notes:   62 y.o. year old male presenting with abdominal pain.    DDx includes but is not limited to cholecystitis, cholangitis, pancreatitis, renal colic.    ED course  Labs with increased leukocytosis WBC 16> 20.28, LFTs are stable (ALT slightly elevated at 48), lipase 51. Urinalysis benign.     Abdominal US with no focal findings. No calculi, wall thickening, or pericholecystic fluid of the gallbladder.    Plan  General Surgery consulted, delay in recommendations as they are in OR  3:59 PM At time of shift change the care of this patient was signed over to the oncoming team pending surgery  recommendations. Anticipate admission for pain control.  I discussed the care of this patient with my supervising physician.                   Clinical Impression:   Final diagnoses:  [R10.9] Abdominal pain                 Yuliana Geronimo PA-C  07/04/22 1600

## 2022-07-04 NOTE — ASSESSMENT & PLAN NOTE
62-year-old male with multiple medical co-morbidities as well as chronic abdominal pain now with acute episode over last 5 days occurring intermittently. CT renal stone study and US not revealing. Pancreatic head mass seen which has been stable since 2014. Has a white count as well as u tox positive for cocaine, amphetamines, marijuana, benzos, and opiates. Abdominal pain is vague and workup thus far un-revealing aside from white count.     -no surgical source of abdominal pain thus far  -can get CT abdomen/pelvis with IV contrast to further delineate  -surgery will follow along, call if exam changes.

## 2022-07-04 NOTE — CONSULTS
[facility] - Emergency Dept  General Surgery  Consult Note    Patient Name: Mike Craft  MRN: [id number]  Code Status: Full Code  Admission Date: 7/4/2022  Hospital Length of Stay: 0 days  Attending Physician: Akin Vasquez MD  Primary Care Provider: Justo Barrow MD    Patient information was obtained from patient, past medical records, ER records and primary team.     Inpatient consult to General surgery  Consult performed by: Prasad Henderson MD  Consult ordered by: Yuliana Geronimo PA-C        Subjective:     Principal Problem: Abdominal pain    History of Present Illness: 62-year-old male with history of hypertension, kidney stones, and chronic vague abdominal pain who presents with acute abdominal pain for 5 days. Patient notes the episodes are intermittent and unpredictable, sometimes waking him from his sleep. The pain will often radiate into his testicles and is associated with nausea and vomiting.  He was recently see for the same on 06/29/22 and diagnosed with pancreatitis and pyelonephritis and was given abx for this. CT at that time also showed a 3.1 hypoechoic mass in pancreas near uncinate for which he had GI follow up for. Of note, this was also seen on CT in 2014. Denies fevers, chills, chest pain, or any difficulty breathing.     At time of exam patient was found sleeping in no distress.      Current Facility-Administered Medications on File Prior to Encounter   Medication    [COMPLETED] fentaNYL 50 mcg/mL injection 100 mcg    [COMPLETED] ketorolac injection 30 mg    lactated ringers infusion    [COMPLETED] metoclopramide HCl injection 10 mg    [COMPLETED] ondansetron injection 4 mg    [COMPLETED] sodium chloride 0.9% bolus 1,000 mL    sodium chloride 0.9% flush 3 mL    [DISCONTINUED] droperidoL injection 2.5 mg     Current Outpatient Medications on File Prior to Encounter   Medication Sig    ciprofloxacin HCl (CIPRO) 500 MG tablet Take 1 tablet (500 mg total) by mouth every 12  (twelve) hours. for 5 days    diazePAM (VALIUM) 10 MG Tab Take 10 mg by mouth 3 (three) times daily.    famotidine (PEPCID) 20 MG tablet Take 1 tablet (20 mg total) by mouth 2 (two) times daily. (Patient taking differently: Take 20 mg by mouth 2 (two) times daily as needed.)    metroNIDAZOLE (FLAGYL) 500 MG tablet Take 1 tablet (500 mg total) by mouth every 8 (eight) hours. for 5 days    ondansetron (ZOFRAN-ODT) 4 MG TbDL Take 1 tablet (4 mg total) by mouth 3 (three) times daily.    pantoprazole (PROTONIX) 40 MG tablet Take 1 tablet (40 mg total) by mouth once daily. (Patient not taking: Reported on 6/29/2022)    tamsulosin (FLOMAX) 0.4 mg Cap Take 1 capsule (0.4 mg total) by mouth once daily.    [DISCONTINUED] amLODIPine (NORVASC) 5 MG tablet Take 2 tablets (10 mg total) by mouth once daily. (Patient not taking: Reported on 6/29/2022)    [DISCONTINUED] levocetirizine (XYZAL) 5 MG tablet Take 1 tablet (5 mg total) by mouth once daily. (Patient not taking: Reported on 1/17/2022)       Review of patient's allergies indicates:   Allergen Reactions    Iodinated contrast media Hives    Contrast media        Past Medical History:   Diagnosis Date    Anxiety     Craft's esophagus 2018    Bronchitis     Chronic pain     back and neck    Cocaine abuse     Depression     GERD (gastroesophageal reflux disease)     History of stomach ulcers     Hypertension     Kidney stones     Sinusitis      Past Surgical History:   Procedure Laterality Date    APPENDECTOMY      CERVICAL FUSION      titanium plate in neck c 4 5    ESOPHAGOGASTRODUODENOSCOPY N/A 7/24/2018    Procedure: EGD (ESOPHAGOGASTRODUODENOSCOPY);  Surgeon: Justo Barrow MD;  [facility];  Service: General;  Laterality: N/A;    ESOPHAGOGASTRODUODENOSCOPY  12/21/2018    ESOPHAGOGASTRODUODENOSCOPY N/A 12/21/2018    Procedure: EGD (ESOPHAGOGASTRODUODENOSCOPY);  Surgeon: Justo Barrow MD;  Location: [facility];  Service:  General;  Laterality: N/A;    FINGER TENDON REPAIR      lil finger lt hand    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     Family History       Problem Relation (Age of Onset)    Alzheimer's disease Mother    Cancer Father    Hyperlipidemia Mother    Hypertension Mother    Kidney disease Brother    Parkinsonism Mother          Tobacco Use    Smoking status: Current Every Day Smoker     Packs/day: 1.00     Years: 30.00     Pack years: 30.00     Types: Cigarettes    Smokeless tobacco: Never Used   Substance and Sexual Activity    Alcohol use: Not Currently     Alcohol/week: 1.0 standard drink     Types: 1 Cans of beer per week     Comment: six pack per weekend    Drug use: Not Currently    Sexual activity: Yes     Partners: Female     Birth control/protection: None     Review of Systems   Constitutional:  Negative for chills and fever.   HENT:  Negative for sore throat.    Eyes:  Negative for redness.   Respiratory:  Negative for shortness of breath.    Cardiovascular:  Negative for chest pain.   Gastrointestinal:  Positive for abdominal pain, nausea and vomiting.   Endocrine: Negative for polyuria.   Genitourinary:  Negative for dysuria.   Musculoskeletal:  Negative for back pain.   Skin:  Negative for rash.   Neurological:  Negative for headaches.   Psychiatric/Behavioral:  Negative for agitation.    Objective:     Vital Signs (Most Recent):  Temp: 98.5 °F (36.9 °C) (07/04/22 1558)  Pulse: 73 (07/04/22 1723)  Resp: 16 (07/04/22 1723)  BP: (!) 194/89 (07/04/22 1723)  SpO2: 97 % (07/04/22 1558)   Vital Signs (24h Range):  Temp:  [97.8 °F (36.6 °C)-98.5 °F (36.9 °C)] 98.5 °F (36.9 °C)  Pulse:  [62-77] 73  Resp:  [16-20] 16  SpO2:  [95 %-99 %] 97 %  BP: (133-204)/() 194/89     Weight: 74.8 kg (165 lb)  Body mass index is 30.18 kg/m².    Physical Exam  Vitals and nursing note reviewed.   Constitutional:       General: He is not in acute distress.     Appearance: He is well-developed.   HENT:      Head:  Normocephalic and atraumatic.      Right Ear: External ear normal.      Left Ear: External ear normal.   Eyes:      Conjunctiva/sclera: Conjunctivae normal.   Cardiovascular:      Rate and Rhythm: Normal rate.   Pulmonary:      Effort: Pulmonary effort is normal.   Abdominal:      Palpations: Abdomen is soft.      Tenderness: There is no abdominal tenderness.      Comments: No real abdominal tenderness. No distension or tympany.    Musculoskeletal:         General: Normal range of motion.      Cervical back: Normal range of motion.   Skin:     General: Skin is warm and dry.   Neurological:      Mental Status: He is alert.   Psychiatric:         Behavior: Behavior normal.       Significant Labs:  I have reviewed all pertinent lab results within the past 24 hours.  CBC:   Recent Labs   Lab 07/04/22  1106   WBC 20.28*   RBC 5.88   HGB 16.9   HCT 52.2      MCV 89   MCH 28.7   MCHC 32.4     CMP:   Recent Labs   Lab 07/04/22  1106      CALCIUM 9.2   ALBUMIN 3.9   PROT 7.1      K 4.9   CO2 24      BUN 21   CREATININE 1.1   ALKPHOS 61   ALT 48*   AST 17   BILITOT 0.4       Significant Diagnostics:  US unimpressive, stable pancreatic mass dating back to 2014.       Assessment/Plan:     * Abdominal pain  62-year-old male with multiple medical co-morbidities as well as chronic abdominal pain now with acute episode over last 5 days occurring intermittently. CT renal stone study and US not revealing. Pancreatic head mass seen which has been stable since 2014. Has a white count as well as u tox positive for cocaine, amphetamines, marijuana, benzos, and opiates. Abdominal pain is vague and workup thus far un-revealing aside from white count.     -no surgical source of abdominal pain thus far  -can get CT abdomen/pelvis with IV contrast to further delineate  -surgery will follow along, call if exam changes.       VTE Risk Mitigation (From admission, onward)         Ordered     enoxaparin injection 40 mg   Daily         07/04/22 1758     IP VTE HIGH RISK PATIENT  Once         07/04/22 1758     Place sequential compression device  Until discontinued         07/04/22 1758                Thank you for your consult. I will follow-up with patient. Please contact us if you have any additional questions.    Prasad Henderson MD  General Surgery  [facility]

## 2022-07-04 NOTE — SUBJECTIVE & OBJECTIVE
Current Facility-Administered Medications on File Prior to Encounter   Medication    [COMPLETED] fentaNYL 50 mcg/mL injection 100 mcg    [COMPLETED] ketorolac injection 30 mg    lactated ringers infusion    [COMPLETED] metoclopramide HCl injection 10 mg    [COMPLETED] ondansetron injection 4 mg    [COMPLETED] sodium chloride 0.9% bolus 1,000 mL    sodium chloride 0.9% flush 3 mL    [DISCONTINUED] droperidoL injection 2.5 mg     Current Outpatient Medications on File Prior to Encounter   Medication Sig    ciprofloxacin HCl (CIPRO) 500 MG tablet Take 1 tablet (500 mg total) by mouth every 12 (twelve) hours. for 5 days    diazePAM (VALIUM) 10 MG Tab Take 10 mg by mouth 3 (three) times daily.    famotidine (PEPCID) 20 MG tablet Take 1 tablet (20 mg total) by mouth 2 (two) times daily. (Patient taking differently: Take 20 mg by mouth 2 (two) times daily as needed.)    metroNIDAZOLE (FLAGYL) 500 MG tablet Take 1 tablet (500 mg total) by mouth every 8 (eight) hours. for 5 days    ondansetron (ZOFRAN-ODT) 4 MG TbDL Take 1 tablet (4 mg total) by mouth 3 (three) times daily.    pantoprazole (PROTONIX) 40 MG tablet Take 1 tablet (40 mg total) by mouth once daily. (Patient not taking: Reported on 6/29/2022)    tamsulosin (FLOMAX) 0.4 mg Cap Take 1 capsule (0.4 mg total) by mouth once daily.    [DISCONTINUED] amLODIPine (NORVASC) 5 MG tablet Take 2 tablets (10 mg total) by mouth once daily. (Patient not taking: Reported on 6/29/2022)    [DISCONTINUED] levocetirizine (XYZAL) 5 MG tablet Take 1 tablet (5 mg total) by mouth once daily. (Patient not taking: Reported on 1/17/2022)       Review of patient's allergies indicates:   Allergen Reactions    Iodinated contrast media Hives    Contrast media        Past Medical History:   Diagnosis Date    Anxiety     Craft's esophagus 2018    Bronchitis     Chronic pain     back and neck    Cocaine abuse     Depression     GERD (gastroesophageal reflux disease)     History of stomach ulcers      Hypertension     Kidney stones     Sinusitis      Past Surgical History:   Procedure Laterality Date    APPENDECTOMY      CERVICAL FUSION      titanium plate in neck c 4 5    ESOPHAGOGASTRODUODENOSCOPY N/A 7/24/2018    Procedure: EGD (ESOPHAGOGASTRODUODENOSCOPY);  Surgeon: Justo Barrow MD;  Location: [facility] END[facility];  Service: General;  Laterality: N/A;    ESOPHAGOGASTRODUODENOSCOPY  12/21/2018    ESOPHAGOGASTRODUODENOSCOPY N/A 12/21/2018    Procedure: EGD (ESOPHAGOGASTRODUODENOSCOPY);  Surgeon: Justo Barrow MD;  Location: [facility] END[facility];  Service: General;  Laterality: N/A;    FINGER TENDON REPAIR      lil finger lt hand    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     Family History       Problem Relation (Age of Onset)    Alzheimer's disease Mother    Cancer Father    Hyperlipidemia Mother    Hypertension Mother    Kidney disease Brother    Parkinsonism Mother          Tobacco Use    Smoking status: Current Every Day Smoker     Packs/day: 1.00     Years: 30.00     Pack years: 30.00     Types: Cigarettes    Smokeless tobacco: Never Used   Substance and Sexual Activity    Alcohol use: Not Currently     Alcohol/week: 1.0 standard drink     Types: 1 Cans of beer per week     Comment: six pack per weekend    Drug use: Not Currently    Sexual activity: Yes     Partners: Female     Birth control/protection: None     Review of Systems   Constitutional:  Negative for chills and fever.   HENT:  Negative for sore throat.    Eyes:  Negative for redness.   Respiratory:  Negative for shortness of breath.    Cardiovascular:  Negative for chest pain.   Gastrointestinal:  Positive for abdominal pain, nausea and vomiting.   Endocrine: Negative for polyuria.   Genitourinary:  Negative for dysuria.   Musculoskeletal:  Negative for back pain.   Skin:  Negative for rash.   Neurological:  Negative for headaches.   Psychiatric/Behavioral:  Negative for agitation.    Objective:     Vital Signs (Most Recent):  Temp: 98.5 °F (36.9  °C) (07/04/22 1558)  Pulse: 73 (07/04/22 1723)  Resp: 16 (07/04/22 1723)  BP: (!) 194/89 (07/04/22 1723)  SpO2: 97 % (07/04/22 1558)   Vital Signs (24h Range):  Temp:  [97.8 °F (36.6 °C)-98.5 °F (36.9 °C)] 98.5 °F (36.9 °C)  Pulse:  [62-77] 73  Resp:  [16-20] 16  SpO2:  [95 %-99 %] 97 %  BP: (133-204)/() 194/89     Weight: 74.8 kg (165 lb)  Body mass index is 30.18 kg/m².    Physical Exam  Vitals and nursing note reviewed.   Constitutional:       General: He is not in acute distress.     Appearance: He is well-developed.   HENT:      Head: Normocephalic and atraumatic.      Right Ear: External ear normal.      Left Ear: External ear normal.   Eyes:      Conjunctiva/sclera: Conjunctivae normal.   Cardiovascular:      Rate and Rhythm: Normal rate.   Pulmonary:      Effort: Pulmonary effort is normal.   Abdominal:      Palpations: Abdomen is soft.      Tenderness: There is no abdominal tenderness.      Comments: No real abdominal tenderness. No distension or tympany.    Musculoskeletal:         General: Normal range of motion.      Cervical back: Normal range of motion.   Skin:     General: Skin is warm and dry.   Neurological:      Mental Status: He is alert.   Psychiatric:         Behavior: Behavior normal.       Significant Labs:  I have reviewed all pertinent lab results within the past 24 hours.  CBC:   Recent Labs   Lab 07/04/22  1106   WBC 20.28*   RBC 5.88   HGB 16.9   HCT 52.2      MCV 89   MCH 28.7   MCHC 32.4     CMP:   Recent Labs   Lab 07/04/22  1106      CALCIUM 9.2   ALBUMIN 3.9   PROT 7.1      K 4.9   CO2 24      BUN 21   CREATININE 1.1   ALKPHOS 61   ALT 48*   AST 17   BILITOT 0.4       Significant Diagnostics:  US unimpressive, stable pancreatic mass dating back to 2014.

## 2022-07-05 VITALS
HEIGHT: 62 IN | WEIGHT: 165 LBS | BODY MASS INDEX: 30.36 KG/M2 | HEART RATE: 70 BPM | DIASTOLIC BLOOD PRESSURE: 72 MMHG | OXYGEN SATURATION: 99 % | RESPIRATION RATE: 18 BRPM | TEMPERATURE: 98 F | SYSTOLIC BLOOD PRESSURE: 115 MMHG

## 2022-07-05 LAB
ALBUMIN SERPL BCP-MCNC: 3.1 G/DL (ref 3.5–5.2)
ALP SERPL-CCNC: 49 U/L (ref 55–135)
ALT SERPL W/O P-5'-P-CCNC: 31 U/L (ref 10–44)
ANION GAP SERPL CALC-SCNC: 10 MMOL/L (ref 8–16)
AST SERPL-CCNC: 14 U/L (ref 10–40)
BASOPHILS # BLD AUTO: 0.13 K/UL (ref 0–0.2)
BASOPHILS NFR BLD: 0.7 % (ref 0–1.9)
BILIRUB SERPL-MCNC: 0.9 MG/DL (ref 0.1–1)
BUN SERPL-MCNC: 16 MG/DL (ref 8–23)
CALCIUM SERPL-MCNC: 7.8 MG/DL (ref 8.7–10.5)
CHLORIDE SERPL-SCNC: 107 MMOL/L (ref 95–110)
CO2 SERPL-SCNC: 22 MMOL/L (ref 23–29)
CREAT SERPL-MCNC: 1 MG/DL (ref 0.5–1.4)
DIFFERENTIAL METHOD: ABNORMAL
EOSINOPHIL # BLD AUTO: 0 K/UL (ref 0–0.5)
EOSINOPHIL NFR BLD: 0.1 % (ref 0–8)
ERYTHROCYTE [DISTWIDTH] IN BLOOD BY AUTOMATED COUNT: 13.4 % (ref 11.5–14.5)
EST. GFR  (AFRICAN AMERICAN): >60 ML/MIN/1.73 M^2
EST. GFR  (NON AFRICAN AMERICAN): >60 ML/MIN/1.73 M^2
GLUCOSE SERPL-MCNC: 94 MG/DL (ref 70–110)
HCT VFR BLD AUTO: 49 % (ref 40–54)
HGB BLD-MCNC: 15.9 G/DL (ref 14–18)
IMM GRANULOCYTES # BLD AUTO: 0.52 K/UL (ref 0–0.04)
IMM GRANULOCYTES NFR BLD AUTO: 2.8 % (ref 0–0.5)
LYMPHOCYTES # BLD AUTO: 2.2 K/UL (ref 1–4.8)
LYMPHOCYTES NFR BLD: 11.8 % (ref 18–48)
MAGNESIUM SERPL-MCNC: 2 MG/DL (ref 1.6–2.6)
MCH RBC QN AUTO: 28.5 PG (ref 27–31)
MCHC RBC AUTO-ENTMCNC: 32.4 G/DL (ref 32–36)
MCV RBC AUTO: 88 FL (ref 82–98)
MONOCYTES # BLD AUTO: 0.9 K/UL (ref 0.3–1)
MONOCYTES NFR BLD: 4.7 % (ref 4–15)
NEUTROPHILS # BLD AUTO: 14.9 K/UL (ref 1.8–7.7)
NEUTROPHILS NFR BLD: 79.9 % (ref 38–73)
NRBC BLD-RTO: 0 /100 WBC
PHOSPHATE SERPL-MCNC: 4.2 MG/DL (ref 2.7–4.5)
PLATELET # BLD AUTO: 276 K/UL (ref 150–450)
PMV BLD AUTO: 10.2 FL (ref 9.2–12.9)
POTASSIUM SERPL-SCNC: 4.4 MMOL/L (ref 3.5–5.1)
PROT SERPL-MCNC: 5.7 G/DL (ref 6–8.4)
RBC # BLD AUTO: 5.57 M/UL (ref 4.6–6.2)
SODIUM SERPL-SCNC: 139 MMOL/L (ref 136–145)
WBC # BLD AUTO: 18.62 K/UL (ref 3.9–12.7)

## 2022-07-05 PROCEDURE — 83735 ASSAY OF MAGNESIUM: CPT

## 2022-07-05 PROCEDURE — S4991 NICOTINE PATCH NONLEGEND: HCPCS | Performed by: STUDENT IN AN ORGANIZED HEALTH CARE EDUCATION/TRAINING PROGRAM

## 2022-07-05 PROCEDURE — 25000003 PHARM REV CODE 250

## 2022-07-05 PROCEDURE — 85025 COMPLETE CBC W/AUTO DIFF WBC: CPT

## 2022-07-05 PROCEDURE — 99239 HOSP IP/OBS DSCHRG MGMT >30: CPT | Mod: GC,,, | Performed by: STUDENT IN AN ORGANIZED HEALTH CARE EDUCATION/TRAINING PROGRAM

## 2022-07-05 PROCEDURE — 25000003 PHARM REV CODE 250: Performed by: STUDENT IN AN ORGANIZED HEALTH CARE EDUCATION/TRAINING PROGRAM

## 2022-07-05 PROCEDURE — 96366 THER/PROPH/DIAG IV INF ADDON: CPT

## 2022-07-05 PROCEDURE — 84100 ASSAY OF PHOSPHORUS: CPT

## 2022-07-05 PROCEDURE — 80053 COMPREHEN METABOLIC PANEL: CPT

## 2022-07-05 PROCEDURE — 99239 PR HOSPITAL DISCHARGE DAY,>30 MIN: ICD-10-PCS | Mod: GC,,, | Performed by: STUDENT IN AN ORGANIZED HEALTH CARE EDUCATION/TRAINING PROGRAM

## 2022-07-05 PROCEDURE — 63600175 PHARM REV CODE 636 W HCPCS: Performed by: STUDENT IN AN ORGANIZED HEALTH CARE EDUCATION/TRAINING PROGRAM

## 2022-07-05 PROCEDURE — G0378 HOSPITAL OBSERVATION PER HR: HCPCS

## 2022-07-05 RX ORDER — NIFEDIPINE 90 MG/1
90 TABLET, EXTENDED RELEASE ORAL DAILY
Qty: 30 TABLET | Refills: 11 | Status: SHIPPED | OUTPATIENT
Start: 2022-07-06 | End: 2023-02-08

## 2022-07-05 RX ORDER — POLYETHYLENE GLYCOL 3350 17 G/17G
17 POWDER, FOR SOLUTION ORAL 3 TIMES DAILY
Qty: 510 G | Refills: 0 | Status: SHIPPED | OUTPATIENT
Start: 2022-07-05

## 2022-07-05 RX ORDER — FAMOTIDINE 40 MG/1
40 TABLET, FILM COATED ORAL DAILY
COMMUNITY

## 2022-07-05 RX ORDER — OXYCODONE AND ACETAMINOPHEN 7.5; 325 MG/1; MG/1
1 TABLET ORAL
COMMUNITY

## 2022-07-05 RX ORDER — POLYETHYLENE GLYCOL 3350 17 G/17G
17 POWDER, FOR SOLUTION ORAL 3 TIMES DAILY
Status: DISCONTINUED | OUTPATIENT
Start: 2022-07-05 | End: 2022-07-05 | Stop reason: HOSPADM

## 2022-07-05 RX ADMIN — NICOTINE 1 PATCH: 7 PATCH TRANSDERMAL at 09:07

## 2022-07-05 RX ADMIN — DIAZEPAM 10 MG: 5 TABLET ORAL at 10:07

## 2022-07-05 RX ADMIN — NIFEDIPINE 90 MG: 30 TABLET, FILM COATED, EXTENDED RELEASE ORAL at 09:07

## 2022-07-05 RX ADMIN — PIPERACILLIN SODIUM AND TAZOBACTAM SODIUM 4.5 G: 4; .5 INJECTION, POWDER, LYOPHILIZED, FOR SOLUTION INTRAVENOUS at 09:07

## 2022-07-05 RX ADMIN — POLYETHYLENE GLYCOL 3350 17 G: 17 POWDER, FOR SOLUTION ORAL at 10:07

## 2022-07-05 RX ADMIN — OXYCODONE AND ACETAMINOPHEN 1 TABLET: 7.5; 325 TABLET ORAL at 06:07

## 2022-07-05 RX ADMIN — SUCRALFATE 1 G: 1 SUSPENSION ORAL at 06:07

## 2022-07-05 RX ADMIN — ALUMINUM HYDROXIDE, MAGNESIUM HYDROXIDE, AND SIMETHICONE 30 ML: 200; 200; 20 SUSPENSION ORAL at 06:07

## 2022-07-05 RX ADMIN — SUCRALFATE 1 G: 1 SUSPENSION ORAL at 12:07

## 2022-07-05 RX ADMIN — PANTOPRAZOLE SODIUM 40 MG: 40 TABLET, DELAYED RELEASE ORAL at 09:07

## 2022-07-05 RX ADMIN — ALUMINUM HYDROXIDE, MAGNESIUM HYDROXIDE, AND SIMETHICONE 30 ML: 200; 200; 20 SUSPENSION ORAL at 09:07

## 2022-07-05 RX ADMIN — PIPERACILLIN SODIUM AND TAZOBACTAM SODIUM 4.5 G: 4; .5 INJECTION, POWDER, LYOPHILIZED, FOR SOLUTION INTRAVENOUS at 12:07

## 2022-07-05 RX ADMIN — OXYCODONE AND ACETAMINOPHEN 1 TABLET: 7.5; 325 TABLET ORAL at 12:07

## 2022-07-05 RX ADMIN — TAMSULOSIN HYDROCHLORIDE 0.4 MG: 0.4 CAPSULE ORAL at 09:07

## 2022-07-05 NOTE — ED NOTES
Received report from PAPO Meyer; assumed care of pt.      Pt is resting comfortably on stretcher. NAD. Pt is aware of POC and denies further needs at this time. Bed locked and low, SR x 2, call light in reach.    Adult Physical Assessment  LOC: Mike Craft, 62 y.o. male verified via two identifiers.  The patient is awake, alert, oriented and speaking appropriately at this time.  APPEARANCE: Patient resting comfortably and appears to be in no acute distress at this time. Patient is clean and well groomed, patient's clothing is properly fastened.  SKIN:The skin is warm and dry, color consistent with ethnicity, patient has normal skin turgor and moist mucus membranes, skin intact, no breakdown or brusing noted.  MUSCULOSKELETAL: Patient moving all extremities well, no obvious swelling or deformities noted.  RESPIRATORY: Airway is open and patent, respirations are spontaneous, patient has a normal effort and rate, no accessory muscle use noted.  CARDIAC: Patient has a normal rate and rhythm, no periphreal edema noted in any extremity, capillary refill < 3 seconds in all extremities.  ABDOMEN: Soft, but tender to palpation, no abdominal distention noted. Bowel sounds present in all four quadrants.   NEUROLOGIC: Eyes open spontaneously, behavior appropriate to situation, follows commands, facial expression symmetrical, bilateral hand grasp equal and even, purposeful motor response noted, normal sensation in all extremities when touched with a finger.
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
## 2022-07-05 NOTE — SUBJECTIVE & OBJECTIVE
Past Medical History:   Diagnosis Date    Anxiety     Craft's esophagus 2018    Bronchitis     Chronic pain     back and neck    Cocaine abuse     Depression     GERD (gastroesophageal reflux disease)     History of stomach ulcers     Hypertension     Kidney stones     Sinusitis        Past Surgical History:   Procedure Laterality Date    APPENDECTOMY      CERVICAL FUSION      titanium plate in neck c 4 5    ESOPHAGOGASTRODUODENOSCOPY N/A 7/24/2018    Procedure: EGD (ESOPHAGOGASTRODUODENOSCOPY);  Surgeon: Justo Barrow MD;  Location: [facility] ENDO;  Service: General;  Laterality: N/A;    ESOPHAGOGASTRODUODENOSCOPY  12/21/2018    ESOPHAGOGASTRODUODENOSCOPY N/A 12/21/2018    Procedure: EGD (ESOPHAGOGASTRODUODENOSCOPY);  Surgeon: Justo Barrow MD;  Location: [facility] ENDO;  Service: General;  Laterality: N/A;    FINGER TENDON REPAIR      lil finger lt hand    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       Review of patient's allergies indicates:   Allergen Reactions    Iodinated contrast media Hives    Contrast media        Current Facility-Administered Medications on File Prior to Encounter   Medication    [COMPLETED] fentaNYL 50 mcg/mL injection 100 mcg    [COMPLETED] ketorolac injection 30 mg    lactated ringers infusion    [COMPLETED] metoclopramide HCl injection 10 mg    [COMPLETED] ondansetron injection 4 mg    [COMPLETED] sodium chloride 0.9% bolus 1,000 mL    sodium chloride 0.9% flush 3 mL    [DISCONTINUED] droperidoL injection 2.5 mg     Current Outpatient Medications on File Prior to Encounter   Medication Sig    ciprofloxacin HCl (CIPRO) 500 MG tablet Take 1 tablet (500 mg total) by mouth every 12 (twelve) hours. for 5 days    diazePAM (VALIUM) 10 MG Tab Take 10 mg by mouth 3 (three) times daily.    famotidine (PEPCID) 20 MG tablet Take 1 tablet (20 mg total) by mouth 2 (two) times daily. (Patient taking differently: Take 20 mg by mouth 2 (two) times daily as needed.)    metroNIDAZOLE (FLAGYL) 500  MG tablet Take 1 tablet (500 mg total) by mouth every 8 (eight) hours. for 5 days    ondansetron (ZOFRAN-ODT) 4 MG TbDL Take 1 tablet (4 mg total) by mouth 3 (three) times daily.    pantoprazole (PROTONIX) 40 MG tablet Take 1 tablet (40 mg total) by mouth once daily. (Patient not taking: Reported on 6/29/2022)    tamsulosin (FLOMAX) 0.4 mg Cap Take 1 capsule (0.4 mg total) by mouth once daily.    [DISCONTINUED] amLODIPine (NORVASC) 5 MG tablet Take 2 tablets (10 mg total) by mouth once daily. (Patient not taking: Reported on 6/29/2022)    [DISCONTINUED] levocetirizine (XYZAL) 5 MG tablet Take 1 tablet (5 mg total) by mouth once daily. (Patient not taking: Reported on 1/17/2022)     Family History       Problem Relation (Age of Onset)    Alzheimer's disease Mother    Cancer Father    Hyperlipidemia Mother    Hypertension Mother    Kidney disease Brother    Parkinsonism Mother          Tobacco Use    Smoking status: Current Every Day Smoker     Packs/day: 1.00     Years: 30.00     Pack years: 30.00     Types: Cigarettes    Smokeless tobacco: Never Used   Substance and Sexual Activity    Alcohol use: Not Currently     Alcohol/week: 1.0 standard drink     Types: 1 Cans of beer per week     Comment: six pack per weekend    Drug use: Not Currently    Sexual activity: Yes     Partners: Female     Birth control/protection: None     Review of Systems   Constitutional:  Negative for appetite change, chills and fever.   HENT:  Negative for congestion.    Eyes:  Negative for photophobia.   Respiratory:  Negative for cough and shortness of breath.    Cardiovascular:  Negative for chest pain and leg swelling.   Gastrointestinal:  Positive for abdominal distention, abdominal pain and vomiting. Negative for nausea.   Genitourinary:  Positive for flank pain. Negative for dysuria.   Musculoskeletal:  Negative for joint swelling.   Neurological:  Negative for headaches.   Psychiatric/Behavioral:  Negative for agitation and confusion.     Objective:     Vital Signs (Most Recent):  Temp: 98.5 °F (36.9 °C) (07/04/22 1558)  Pulse: 73 (07/04/22 1723)  Resp: 16 (07/04/22 1723)  BP: (!) 194/89 (07/04/22 1723)  SpO2: 97 % (07/04/22 1558)   Vital Signs (24h Range):  Temp:  [97.8 °F (36.6 °C)-98.5 °F (36.9 °C)] 98.5 °F (36.9 °C)  Pulse:  [62-77] 73  Resp:  [16-20] 16  SpO2:  [95 %-99 %] 97 %  BP: (133-204)/() 194/89     Weight: 74.8 kg (165 lb)  Body mass index is 30.18 kg/m².    Physical Exam  Constitutional:       Appearance: He is not ill-appearing.   HENT:      Head: Normocephalic and atraumatic.      Right Ear: External ear normal.      Left Ear: External ear normal.      Nose: Nose normal.      Mouth/Throat:      Mouth: Mucous membranes are moist.   Eyes:      General: No scleral icterus.     Extraocular Movements: Extraocular movements intact.      Conjunctiva/sclera: Conjunctivae normal.   Cardiovascular:      Rate and Rhythm: Normal rate and regular rhythm.   Pulmonary:      Effort: Pulmonary effort is normal.      Breath sounds: Normal breath sounds. No wheezing or rales.   Abdominal:      General: There is distension.      Tenderness: There is abdominal tenderness.      Comments: Tenderness to deep palpation on right side   Musculoskeletal:         General: No swelling. Normal range of motion.      Cervical back: Normal range of motion.   Skin:     General: Skin is warm and dry.   Neurological:      General: No focal deficit present.      Mental Status: He is alert.   Psychiatric:         Mood and Affect: Mood normal.      Comments: Tearful on interview           Significant Labs: All pertinent labs within the past 24 hours have been reviewed.  Recent Lab Results  (Last 5 results in the past 24 hours)        07/04/22  1650   07/04/22  1123   07/04/22  1106   07/04/22  0346   07/04/22  0248        Benzodiazepines       Positive         Phencyclidine       Negative         Tricyclic Antidepressants (TCA), Urine       Negative          OXYCODONE       Negative         PROPOXYPHENE       Positive         MDMA- ECSTASY       Negative         Albumin     3.9           Alkaline Phosphatase     61           ALT     48           Amphetamine Screen, Ur       Positive         Anion Gap     8           Appearance, UA   Clear     Clear         AST     17           Barbiturate Screen, Ur       Negative         Baso #     0.12           Basophil %     0.6           Bilirubin (UA)   Negative     Negative         BILIRUBIN TOTAL     0.4  Comment: For infants and newborns, interpretation of results should be based  on gestational age, weight and in agreement with clinical  observations.    Premature Infant recommended reference ranges:  Up to 24 hours.............<8.0 mg/dL  Up to 48 hours............<12.0 mg/dL  3-5 days..................<15.0 mg/dL  6-29 days.................<15.0 mg/dL             BUN     21           Calcium     9.2           Chloride     107           CO2     24           Cocaine (Metab.)       Positive         Color, UA   Yellow     Yellow         Creatinine     1.1           Differential Method     Automated           eGFR if      >60.0           eGFR if non      >60.0  Comment: Calculation used to obtain the estimated glomerular filtration  rate (eGFR) is the CKD-EPI equation.              Eos #     0.0           Eosinophil %     0.0           Glucose     107           Glucose, UA   Negative     Negative         Gran # (ANC)     16.5           Gran %     81.5           Hematocrit     52.2           Hemoglobin     16.9           Immature Grans (Abs)     0.71  Comment: Mild elevation in immature granulocytes is non specific and   can be seen in a variety of conditions including stress response,   acute inflammation, trauma and pregnancy. Correlation with other   laboratory and clinical findings is essential.             Immature Granulocytes     3.5           Ketones, UA   Negative     Negative          Lactate, John 1.7  Comment: Falsely low lactic acid results can be found in samples   containing >=13.0 mg/dL total bilirubin and/or >=3.5 mg/dL   direct bilirubin.                 Leukocytes, UA   Negative     Negative         Lipase     51           Lymph #     1.8           Lymph %     9.1           MCH     28.7           MCHC     32.4           MCV     89           Microscopic Comment   SEE COMMENT  Comment: Other formed elements not mentioned in the report are not   present in the microscopic examination.                Mono #     1.1           Mono %     5.3           MPV     10.4           NITRITE UA   Negative     Negative         nRBC     0           Occult Blood UA   1+     Negative         Opiate Scrn, Ur       Negative         pH, UA   5.0     6.0         Platelets     288           Potassium     4.9           PROTEIN TOTAL     7.1           Protein, UA   Negative  Comment: Recommend a 24 hour urine protein or a urine   protein/creatinine ratio if globulin induced proteinuria is  clinically suspected.       Negative  Comment: Recommend a 24 hour urine protein or a urine   protein/creatinine ratio if globulin induced proteinuria is  clinically suspected.           RBC     5.88           RBC, UA   1             RDW     13.4           Sodium     139           Specific Gravity, UA   1.025     >=1.030         Specimen UA   Urine, Clean Catch     Urine, Unspecified         Marijuana (THC) Metabolite       Positive         Toxicology Information       SEE COMMENT  Comment: This screen includes the following classes of drugs at the   listed cut-off:      Amph =999 ng/ml  Beronica  =199 ng/ml  Chip =199 ng/ml  THC =49.9 ng/ml  COCM =299 ng/ml  METD =299 ng/ml  OP =299 ng/ml  PCP = 24.9 ng/ml    High concentrations of Diphenhydramine may cross-react with  Phencyclidine PCP screening immunoassay giving a false   positive result.    High concentrations of Methylenedioxymethamphetamine (MDMA aka  Ectasy) and other  structurally similar compounds may cross-   react with the Amphetamine/Methamphetamine screening   immunoassay giving a false positive result.    A metabolite of the anti-HIV drug Sustiva () may cause  false positive results in the Marijuana metabolite (THC)   screening assay.    Note: This exception list includes only more common   interferants in toxicology screen testing.  Because of many   cross-reactants, positive results on toxicology drug screens   should be confirmed whenever results do not correlate with   clinical presentation.    This report is intended for use in clinical monitoring and  management of patients. It is not intended for use in   employment related drug testing.    Presumptive positive results are unconfirmed and may be used   only for medical purposes.    Assay Intended Use: This assay provides only a preliminary analytical  test result. A more specific alternate chemical method must be used  to obtain a confirmed analytical result. Gas chromatography/mass  spectrometry (GS/MS)is the preferred confirmatory method. Clinical  consideration and professional judgement should be applied to any   drug of abuse test result, particularly when preliminary results  are used.           Troponin I         0.018  Comment: The reference interval for Troponin I represents the 99th percentile   cutoff   for our facility and is consistent with 3rd generation assay   performance.         UROBILINOGEN UA       Negative         WBC, UA   0             WBC     20.28                                  Significant Imaging: I have reviewed all pertinent imaging results/findings within the past 24 hours.

## 2022-07-05 NOTE — ASSESSMENT & PLAN NOTE
Diagnosed with pancreatitis at [facility]. CT with unspecified pancreatic lesion    -CT A/P ordered  -Zosyn for enteric coverage

## 2022-07-05 NOTE — ASSESSMENT & PLAN NOTE
63 yo M presents with 5d of right-sided abdominal pain radiating to R flank/back, associated with vomiting this morning. Patient recently diagnosed with pancreatitis and pyelonephritis at [facility] and treated with appropriate abx but symptoms have not resolved. Pain woke pt from sleep last night. CT has previously shown unspecified pancreatic lesion, for which pt has [place] [facility]     -Gen surg consulted, feel it is not a biliary problem and do not currently recommend surgical intervention  -Patient on Zosyn for coverage of enteric bacteria  -PRN Zofran for nausea  -CT A/P with contrast ordered

## 2022-07-05 NOTE — ASSESSMENT & PLAN NOTE
63 yo M presents with 5d of right-sided abdominal pain radiating to R flank/back, associated with vomiting this morning. Patient recently diagnosed with pancreatitis and pyelonephritis at [facility] and treated with appropriate abx but symptoms have not resolved. Pain woke pt from sleep last night. CT has previously shown unspecified pancreatic lesion, for which pt has biopsy scheduled [place] [facility] [place].     -Gen surg consulted, feel it is not a biliary problem and do not currently recommend surgical intervention  -Patient on Zosyn for coverage of enteric bacteria  -PRN Zofran for nausea  -CT A/P with contrast ordered, no acute abnormality
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
## 2022-07-05 NOTE — PHARMACY MED REC
"Admission Medication History     The home medication history was taken by Jazmin Gardner.    You may go to "Admission" then "Reconcile Home Medications" tabs to review and/or act upon these items.      The home medication list has been updated by the Pharmacy department.    Please read ALL comments highlighted in yellow.    Please address this information as you see fit.     Feel free to contact us if you have any questions or require assistance.      The medications listed below were removed from the home medication list. Please reorder if appropriate:  Patient reports no longer taking the following medication(s):   AMLODIPINE 5 MG   LEVOCETIRIZINE 5 MG   ONDANSETRON 4 MG ODT   PANTOPRAZOLE 40 MG   TAMSULOSIN 0.4 MG      Medications listed below were obtained from: Patient/family  Medication Sig    ciprofloxacin HCl (CIPRO) 500 MG tablet   Take 1 tablet (500 mg total) by mouth every 12 (twelve) hours. for 5 days    diazePAM (VALIUM) 10 MG Tab   Take 10 mg by mouth 3 (three) times daily.    famotidine (PEPCID) 40 MG tablet   Take 40 mg by mouth once daily.    metroNIDAZOLE (FLAGYL) 500 MG tablet   Take 1 tablet (500 mg total) by mouth every 8 (eight) hours. for 5 days    oxyCODONE-acetaminophen (PERCOCET) 7.5-325 mg per tablet Take 1 tablet by mouth every 6 to 8 hours as needed for Pain.             Jazmin Gardner  EXT [id number]                    .          "

## 2022-07-05 NOTE — HOSPITAL COURSE
Patient presented for right-sided abdominal pain with radiation to the back x 5 days and one episode of vomiting. Patient with distended abdomen tender only to deep palpation. Patient afebrile in ED but hypertensive to 200s/100s, given hydralazine. WBC 20.28 in ED, started on Zosyn. Patient given morphine 4mg IV x3 in ED as well as Zofran x1. CT A/P with no acute abnormality. WBC decreased to 18, infection not suspected as patient afebrile and no source identified. Blood cx, urine cx both with no growth.

## 2022-07-05 NOTE — ASSESSMENT & PLAN NOTE
Patient states that for the last 2 years he has only had the occasional beer.    -monitor for symptoms of withdrawal

## 2022-07-05 NOTE — ED NOTES
Pt resting quietly on stretcher.no acute distress or discomfort observed.  resp even and unlabored. Bed locked in lowest position; side rails up and locked x 2; call light, bedside table, and personal belongings within reach. Room assessed for safety measures and cleanliness; no action needed at this time. will continue to monitor.

## 2022-07-05 NOTE — ASSESSMENT & PLAN NOTE
[facility] with unspecified pancreatic lesion    -CT A/P ordered  -Zosyn for enteric coverage

## 2022-07-09 LAB
BACTERIA BLD CULT: NORMAL
BACTERIA BLD CULT: NORMAL

## 2023-02-02 ENCOUNTER — HOSPITAL ENCOUNTER (EMERGENCY)
Facility: HOSPITAL | Age: 63
Discharge: HOME | End: 2023-02-02
Attending: EMERGENCY MEDICINE
Payer: MEDICARE

## 2023-02-02 VITALS
RESPIRATION RATE: 18 BRPM | BODY MASS INDEX: 29.63 KG/M2 | DIASTOLIC BLOOD PRESSURE: 78 MMHG | WEIGHT: 162 LBS | SYSTOLIC BLOOD PRESSURE: 135 MMHG | HEART RATE: 96 BPM | TEMPERATURE: 98 F | OXYGEN SATURATION: 97 %

## 2023-02-02 DIAGNOSIS — M54.50 ACUTE RIGHT-SIDED LOW BACK PAIN WITHOUT SCIATICA: Primary | ICD-10-CM

## 2023-02-02 PROCEDURE — 99284 EMERGENCY DEPT VISIT MOD MDM: CPT | Mod: ,,, | Performed by: EMERGENCY MEDICINE

## 2023-02-02 PROCEDURE — 63600175 PHARM REV CODE 636 W HCPCS: Performed by: EMERGENCY MEDICINE

## 2023-02-02 PROCEDURE — 96375 TX/PRO/DX INJ NEW DRUG ADDON: CPT

## 2023-02-02 PROCEDURE — 99284 EMERGENCY DEPT VISIT MOD MDM: CPT | Mod: 25

## 2023-02-02 PROCEDURE — 96374 THER/PROPH/DIAG INJ IV PUSH: CPT

## 2023-02-02 PROCEDURE — 99284 PR EMERGENCY DEPT VISIT,LEVEL IV: ICD-10-PCS | Mod: ,,, | Performed by: EMERGENCY MEDICINE

## 2023-02-02 PROCEDURE — 25000003 PHARM REV CODE 250: Performed by: EMERGENCY MEDICINE

## 2023-02-02 RX ORDER — TIZANIDINE 4 MG/1
4 TABLET ORAL EVERY 6 HOURS PRN
Qty: 40 TABLET | Refills: 0 | Status: SHIPPED | OUTPATIENT
Start: 2023-02-02 | End: 2023-02-12

## 2023-02-02 RX ORDER — NAPROXEN 500 MG/1
500 TABLET ORAL 2 TIMES DAILY WITH MEALS
Qty: 14 TABLET | Refills: 0 | Status: SHIPPED | OUTPATIENT
Start: 2023-02-02 | End: 2023-02-09

## 2023-02-02 RX ORDER — KETOROLAC TROMETHAMINE 30 MG/ML
10 INJECTION, SOLUTION INTRAMUSCULAR; INTRAVENOUS
Status: COMPLETED | OUTPATIENT
Start: 2023-02-02 | End: 2023-02-02

## 2023-02-02 RX ORDER — ACETAMINOPHEN 500 MG
1000 TABLET ORAL
Status: COMPLETED | OUTPATIENT
Start: 2023-02-02 | End: 2023-02-02

## 2023-02-02 RX ORDER — MORPHINE SULFATE 4 MG/ML
4 INJECTION, SOLUTION INTRAMUSCULAR; INTRAVENOUS
Status: COMPLETED | OUTPATIENT
Start: 2023-02-02 | End: 2023-02-02

## 2023-02-02 RX ORDER — LIDOCAINE 50 MG/G
1 PATCH TOPICAL
Status: DISCONTINUED | OUTPATIENT
Start: 2023-02-02 | End: 2023-02-03 | Stop reason: HOSPADM

## 2023-02-02 RX ORDER — DEXAMETHASONE SODIUM PHOSPHATE 4 MG/ML
12 INJECTION, SOLUTION INTRA-ARTICULAR; INTRALESIONAL; INTRAMUSCULAR; INTRAVENOUS; SOFT TISSUE
Status: COMPLETED | OUTPATIENT
Start: 2023-02-02 | End: 2023-02-02

## 2023-02-02 RX ORDER — ACETAMINOPHEN 325 MG/1
325 TABLET ORAL EVERY 8 HOURS PRN
Qty: 21 TABLET | Refills: 0 | Status: SHIPPED | OUTPATIENT
Start: 2023-02-02 | End: 2023-02-09

## 2023-02-02 RX ORDER — LIDOCAINE 50 MG/G
1 PATCH TOPICAL DAILY
Qty: 14 PATCH | Refills: 0 | Status: SHIPPED | OUTPATIENT
Start: 2023-02-02 | End: 2023-02-16

## 2023-02-02 RX ORDER — SULFAMETHOXAZOLE AND TRIMETHOPRIM 800; 160 MG/1; MG/1
1 TABLET ORAL 2 TIMES DAILY
Qty: 14 TABLET | Refills: 0 | Status: SHIPPED | OUTPATIENT
Start: 2023-02-02 | End: 2023-02-09

## 2023-02-02 RX ADMIN — DEXAMETHASONE SODIUM PHOSPHATE 12 MG: 4 INJECTION INTRA-ARTICULAR; INTRALESIONAL; INTRAMUSCULAR; INTRAVENOUS; SOFT TISSUE at 10:02

## 2023-02-02 RX ADMIN — KETOROLAC TROMETHAMINE 10 MG: 30 INJECTION, SOLUTION INTRAMUSCULAR; INTRAVENOUS at 10:02

## 2023-02-02 RX ADMIN — LIDOCAINE 1 PATCH: 50 PATCH CUTANEOUS at 10:02

## 2023-02-02 RX ADMIN — MORPHINE SULFATE 4 MG: 4 INJECTION INTRAVENOUS at 10:02

## 2023-02-02 RX ADMIN — ACETAMINOPHEN 1000 MG: 500 TABLET ORAL at 10:02

## 2023-02-03 NOTE — ED TRIAGE NOTES
Pt states that he has been having right flank pain since yesterday. Pt states that he was diagnosed with Kidney Stones @ [facility] with a renal cyst. Pt states that pain radiates from back to groin. Pt was unable to get medications refilled. Pt denies trouble urinating and no blood in urine.

## 2023-02-03 NOTE — DISCHARGE INSTRUCTIONS
Please take 1 tab of Norco with 1 tab of Tylenol 325 mg every 8 hours for pain control you can also take naproxen 500 mg twice a day with food and lidocaine patches 1 patch a day for 12 hours and remove it for 12 hours.  You can also use tizanidine as a muscle relaxant    Please follow-up with your family doctor for reassessment in approximately 1 week.  Your CTs and abdominal ultrasounds showed a pancreatic mass that has been stable over time please follow-up with your family doctor for further assessment    Return to the emergency department if you develop worsening back pain, inability to void or urinary incontinence, fever, nausea, vomiting, lower extremity weakness or numbness or any other concerns

## 2023-02-03 NOTE — ED PROVIDER NOTES
SCRIBE #1 NOTE: Pattie LOERA, am scribing for, and in the presence of,  Marisela Valdez MD. Other sections scribed: HPI.      CC: Flank Pain (Pt was seen at [facility] regarding flank pain. Diagnosed with kidney stone. Has not been able to fill out medications yet and he is a lot of pain. )      History provided by:   Patient     HPI: Mike Craft is a 63 y.o. year old male who presents to the ED for right flank pain onset last night. Patient states his pain begins at the right side back and radiates towards the right flank. States he has had this pain before and occurs approximately every 6 months after receiving his routine injections for his back pain. Denies fever, urinary changes, N/V. Although, patient reports one episode of self induced emesis which did not improve his pain. He was seen at [facility] ED twice earlier today for the same symptoms. He had a CT scan that was unremarkable for kidney stones. He was prescribed Bactrim however he has yet to get this filled.      Past Medical History:   Diagnosis Date    Anxiety     Craft's esophagus 2018    Bronchitis     Chronic pain     back and neck    Cocaine abuse     Depression     GERD (gastroesophageal reflux disease)     History of stomach ulcers     Hypertension     Kidney stones     Sinusitis      Past Surgical History:   Procedure Laterality Date    APPENDECTOMY      CERVICAL FUSION      titanium plate in neck c 4 5    ESOPHAGOGASTRODUODENOSCOPY N/A 7/24/2018    Procedure: EGD (ESOPHAGOGASTRODUODENOSCOPY);  Surgeon: Justo Barrow MD;  Location: [facility];  Service: General;  Laterality: N/A;    ESOPHAGOGASTRODUODENOSCOPY  12/21/2018    ESOPHAGOGASTRODUODENOSCOPY N/A 12/21/2018    Procedure: EGD (ESOPHAGOGASTRODUODENOSCOPY);  Surgeon: Justo Barrow MD;  Location: [facility];  Service: General;  Laterality: N/A;    FINGER TENDON REPAIR      lil finger lt hand    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     Family History    Problem Relation Age of Onset    Parkinsonism Mother     Alzheimer's disease Mother     Hypertension Mother     Hyperlipidemia Mother     Cancer Father         lung ca    Kidney disease Brother      Current Facility-Administered Medications on File Prior to Encounter   Medication Dose Route Frequency Provider Last Rate Last Admin    [COMPLETED] aluminum-magnesium hydroxide-simethicone 200-200-20 mg/5 mL suspension 30 mL  30 mL Oral Once Ramiro Mccarthy III, MD   30 mL at 02/02/23 0647    And    [COMPLETED] LIDOcaine HCl 2% oral solution 15 mL  15 mL Oral Once Ramiro Mccarthy III, MD   15 mL at 02/02/23 0647    [COMPLETED] cefTRIAXone (ROCEPHIN) 2 g in dextrose 5 % in water (D5W) 5 % 50 mL IVPB (MB+)  2 g Intravenous ED 1 Time Ramiro Mccarthy III, MD   Stopped at 02/02/23 0712    [COMPLETED] droperidoL injection 2.5 mg  2.5 mg Intravenous Once Ramiro Mccarthy III, MD   2.5 mg at 02/02/23 0641    [COMPLETED] ketorolac injection 30 mg  30 mg Intravenous ED 1 Time Ramiro Mccarthy III, MD   30 mg at 02/02/23 0221    lactated ringers infusion   Intravenous Continuous Justo Barrow MD 75 mL/hr at 12/21/18 0815 New Bag at 12/21/18 0815    [COMPLETED] morphine injection 4 mg  4 mg Intravenous ED 1 Time Ramiro Mccarthy III, MD   4 mg at 02/02/23 0641    [COMPLETED] ondansetron injection 4 mg  4 mg Intravenous ED 1 Time Ramiro Mccarthy III, MD   4 mg at 02/02/23 0221    [COMPLETED] sodium chloride 0.9% bolus 1,000 mL 1,000 mL  1,000 mL Intravenous ED 1 Time Ramiro Mccarthy III,  mL/hr at 02/02/23 0221 1,000 mL at 02/02/23 0221    sodium chloride 0.9% flush 3 mL  3 mL Intravenous PRN Justo Barrow MD        [COMPLETED] sulfamethoxazole-trimethoprim 800-160mg per tablet 1 tablet  1 tablet Oral ED 1 Time Ramiro Mccarthy III, MD   1 tablet at 02/02/23 0317    [COMPLETED] tamsulosin 24 hr capsule 0.4 mg  0.4 mg Oral ED 1 Time Ramiro Mccarthy III, MD   0.4 mg at 02/02/23  0317     Current Outpatient Medications on File Prior to Encounter   Medication Sig Dispense Refill    diazePAM (VALIUM) 10 MG Tab Take 10 mg by mouth 3 (three) times daily.  5    tamsulosin (FLOMAX) 0.4 mg Cap Take 1 capsule (0.4 mg total) by mouth once daily. 10 capsule 0    famotidine (PEPCID) 40 MG tablet Take 40 mg by mouth once daily.      NIFEdipine (PROCARDIA-XL) 90 MG (OSM) 24 hr tablet Take 1 tablet (90 mg total) by mouth once daily. 30 tablet 11    ondansetron (ZOFRAN-ODT) 4 MG TbDL Take 1 tablet (4 mg total) by mouth 3 (three) times daily. 10 tablet 0    oxyCODONE-acetaminophen (PERCOCET) 7.5-325 mg per tablet Take 1 tablet by mouth every 6 to 8 hours as needed for Pain.      pantoprazole (PROTONIX) 40 MG tablet Take 1 tablet (40 mg total) by mouth once daily. 90 tablet 0    polyethylene glycol (GLYCOLAX) 17 gram/dose powder Mix 1 capful (17 g) in liquid and drink by mouth 3 (three) times daily for 7 days 510 g 0    [DISCONTINUED] amLODIPine (NORVASC) 5 MG tablet Take 2 tablets (10 mg total) by mouth once daily. (Patient not taking: Reported on 6/29/2022) 30 tablet 0    [DISCONTINUED] levocetirizine (XYZAL) 5 MG tablet Take 1 tablet (5 mg total) by mouth once daily. (Patient not taking: Reported on 1/17/2022) 30 tablet 0     Iodinated contrast media and Contrast media  Social History     Socioeconomic History    Marital status:    Occupational History    Occupation: disabled   Tobacco Use    Smoking status: Every Day     Packs/day: 1.00     Years: 30.00     Pack years: 30.00     Types: Cigarettes    Smokeless tobacco: Never   Substance and Sexual Activity    Alcohol use: Not Currently     Alcohol/week: 1.0 standard drink     Types: 1 Cans of beer per week     Comment: six pack per weekend    Drug use: Not Currently    Sexual activity: Yes     Partners: Female     Birth control/protection: None         PHYSICAL EXAM:  Vitals:    02/02/23 2351   BP: 135/78   Pulse: 96   Resp: 18   Temp:      VS:  triage VS reviewed    general: comfortable, in no acute distress, pleasant, well nourished  HENT: neck symmetric, trachea midline  Eyes: PERRL,no conjunctival injection and symmetrical eyelids  CV: RRR, no  murmurs, no rubs, no gallops, no LE edema  Resp: comfortable breathing, speaks in full sentences, CTAB, no wheezing, no crackles, no ronchi  ABD:  soft, ND, no masses, + normal BS, NT  Renal: No CVAT  Neuro: AAO x 3, 5/5 strength x 4 extremities, sensation intact, face symmetric, speech normal  MSK: NC/AT, extremities w/out deformity.  No midline T and tenderness palpation, tenderness palpation over the right paraspinal area wrapping around to the right side of the abdomen  Skin: warm, dry            DATA & INTERVENTIONS:    LABS reviewed:  Labs Reviewed - No data to display      RADIOLOGY reviewed:  Imaging Results    None         MEDICATIONS/FLUIDS:  Medications   LIDOcaine 5 % patch 1 patch (1 patch Transdermal Patch Applied 2/2/23 2252)   acetaminophen tablet 1,000 mg (1,000 mg Oral Given 2/2/23 2251)   ketorolac injection 9.999 mg (9.999 mg Intravenous Given 2/2/23 2252)   morphine injection 4 mg (4 mg Intravenous Given 2/2/23 2258)   dexAMETHasone injection 12 mg (12 mg Intravenous Given 2/2/23 2252)         MDM:  Mike Craft is a 63 y.o. year old male who presents to the ED for right-sided back and right-sided abdomen pain patient reports similar episodes about every 6 months.    Patient was seen earlier at the outside hospital workup was reviewed labs with no gross abnormalities except a white count of 16.5, UA negative for nitrites or leukocytes however the CT had bilateral perinephric edema.    History suggestive of musculoskeletal/back pain, CT was negative for kidney stones.    Pain control in the emergency department with Tylenol, Toradol, lidocaine patch, dexamethasone, IV morphine    Pain completely resolved in the ED      Medication given in the ED:  See above    11:45 PM  On reassessment  patient states his pain has resolved.    Old records obtained and reviewed:   CT renal stone performed earlier today, 2 simple appearing renal right renal cysts measuring up to 1.6 cm, bilateral perinephric edema, no kidney stones no hydronephrosis.  Mild ectasia of the distal infrarenal abdominal aorta  Also mild urinary bladder wall thickening diverticulosis no diverticulitis    Chart reviewed patient with pancreatic mass since 2014 stable    Labs from earlier today reviewed  Lipase 73   CMP no gross abnormalities   CBC with white count of 16.5  UA positive for blood, negative for nitrites and leukocytes   Urine microscopy with 2 RBCs, 4 WBCs      Secondary to CT findings of bilateral perinephric edema will start the patient on Bactrim, pain control at home with Tylenol, Norco (he already has norco at home), Aleve, lidocaine patches, tizanidine  Patient aware the CT abdomen pelvis shows pancreatic mass that has been stable on multiple previous Cts.  Patient was advised to follow-up with PCP for further investigations    The patient has been carefully educated about symptoms and conditions that should prompt immediate return to the ED for recheck or further evaluation. Told to return immediately for any new or worsening or progressive symptoms.    IMPRESSION:  1.) Low back pain      Dispo: Discharge    Critical Care Time: N/A    Scribe Attestation:  Scribe #1: I performed the above scribed service and the documentation accurately describes the services I performed. I attest to the accuracy of the note.    I, Dr. Valdez, personally performed the services described in this documentation. All medical record entries made by the scribe were at my direction and in my presence.  I have reviewed the chart and agree that the record reflects my personal performance and is accurate and complete.      Marisela Valdez MD  02/03/23 0128

## 2023-02-08 ENCOUNTER — OFFICE VISIT (OUTPATIENT)
Dept: NEPHROLOGY | Facility: CLINIC | Age: 63
End: 2023-02-08
Payer: MEDICARE

## 2023-02-08 VITALS — WEIGHT: 156.5 LBS | OXYGEN SATURATION: 97 % | HEART RATE: 84 BPM | BODY MASS INDEX: 28.63 KG/M2

## 2023-02-08 DIAGNOSIS — I10 PRIMARY HYPERTENSION: ICD-10-CM

## 2023-02-08 DIAGNOSIS — Q63.2 PELVIC KIDNEY: ICD-10-CM

## 2023-02-08 DIAGNOSIS — N18.31 STAGE 3A CHRONIC KIDNEY DISEASE: Primary | ICD-10-CM

## 2023-02-08 DIAGNOSIS — K86.9 PANCREATIC LESION: ICD-10-CM

## 2023-02-08 PROCEDURE — 3066F NEPHROPATHY DOC TX: CPT | Mod: CPTII,S$GLB,, | Performed by: NURSE PRACTITIONER

## 2023-02-08 PROCEDURE — 1159F MED LIST DOCD IN RCRD: CPT | Mod: CPTII,S$GLB,, | Performed by: NURSE PRACTITIONER

## 2023-02-08 PROCEDURE — 99999 PR PBB SHADOW E&M-EST. PATIENT-LVL IV: ICD-10-PCS | Mod: PBBFAC,,, | Performed by: NURSE PRACTITIONER

## 2023-02-08 PROCEDURE — 3008F BODY MASS INDEX DOCD: CPT | Mod: CPTII,S$GLB,, | Performed by: NURSE PRACTITIONER

## 2023-02-08 PROCEDURE — 99204 OFFICE O/P NEW MOD 45 MIN: CPT | Mod: S$GLB,,, | Performed by: NURSE PRACTITIONER

## 2023-02-08 PROCEDURE — 3008F PR BODY MASS INDEX (BMI) DOCUMENTED: ICD-10-PCS | Mod: CPTII,S$GLB,, | Performed by: NURSE PRACTITIONER

## 2023-02-08 PROCEDURE — 3066F PR DOCUMENTATION OF TREATMENT FOR NEPHROPATHY: ICD-10-PCS | Mod: CPTII,S$GLB,, | Performed by: NURSE PRACTITIONER

## 2023-02-08 PROCEDURE — 99999 PR PBB SHADOW E&M-EST. PATIENT-LVL IV: CPT | Mod: PBBFAC,,, | Performed by: NURSE PRACTITIONER

## 2023-02-08 PROCEDURE — 1159F PR MEDICATION LIST DOCUMENTED IN MEDICAL RECORD: ICD-10-PCS | Mod: CPTII,S$GLB,, | Performed by: NURSE PRACTITIONER

## 2023-02-08 PROCEDURE — 99204 PR OFFICE/OUTPT VISIT, NEW, LEVL IV, 45-59 MIN: ICD-10-PCS | Mod: S$GLB,,, | Performed by: NURSE PRACTITIONER

## 2023-02-08 RX ORDER — AMLODIPINE BESYLATE 10 MG/1
10 TABLET ORAL DAILY
Qty: 30 TABLET | Refills: 11 | Status: SHIPPED | OUTPATIENT
Start: 2023-02-08 | End: 2024-02-08

## 2023-02-08 RX ORDER — VALSARTAN AND HYDROCHLOROTHIAZIDE 80; 12.5 MG/1; MG/1
1 TABLET, FILM COATED ORAL NIGHTLY
COMMUNITY
Start: 2022-12-08

## 2023-02-08 NOTE — PROGRESS NOTES
Subjective:       Patient ID: Mike Craft is a 63 y.o.  male who presents for new evaluation of kidney stones.      HPI     Patient is new to me. Prior pertinent chart reviewed since this is patient's first appointment with me. Patient presents for evaluation of kidney stones.  Baseline creatinine of 0.9-1.1. Significant other medical problems include HTN, GERD, Craft's esophagus, pancreatitis, HCV, alcoholism, cocaine use. He has had a total of 3 visits to the ED 2/2/23 for right flank pain thought to be associated with kidney stones. Imaging shows no evidence of renal stones. He was treated with IV analgesics, zofran, IV rocephin, and IVF and discharged home on bactrim for presumed UTI and protonix with plans for Urology referral. He presented for a fourth time to the ED for similar pain and constipation. Septic w/u done at this time unremarkable. He received lactulose with relief and left AMA. He reports that he has this right flank/abdominal pain about every 6 months. His BP has been running high as well and he splits his valsartan-HCTZ in half and takes BID. He took a valium prior to his appointment today and thought that might help his BP. He denies further alcohol or cocaine use. The patient denies herbal supplements, recreational drugs, recent episode of dehydration, diarrhea, nausea or vomiting, acute illness.     Significant family hx includes: Brother - kidney disease    Review of Systems   Constitutional:  Negative for fever.   Respiratory:  Positive for cough (chronic). Negative for shortness of breath.    Cardiovascular:  Negative for chest pain and leg swelling.   Gastrointestinal:  Positive for abdominal pain (right flank/ right abdominal). Negative for diarrhea, nausea and vomiting.   Genitourinary:  Negative for difficulty urinating, dysuria and hematuria.   Musculoskeletal:  Positive for back pain.        Occasional right-sided sciatic pain   Neurological:  Negative for syncope.    "  Objective:       Pulse 84, weight 71 kg (156 lb 8.4 oz), SpO2 97 %.  Physical Exam  Vitals reviewed.   Constitutional:       General: He is not in acute distress.  Eyes:      General: No scleral icterus.  Cardiovascular:      Rate and Rhythm: Normal rate and regular rhythm.   Pulmonary:      Effort: Pulmonary effort is normal. No respiratory distress.      Breath sounds: No wheezing or rales.   Musculoskeletal:      Right lower leg: No edema.      Left lower leg: No edema.   Skin:     General: Skin is warm and dry.   Neurological:      General: No focal deficit present.      Mental Status: He is alert and oriented to person, place, and time.   Psychiatric:         Mood and Affect: Mood normal.         Behavior: Behavior normal.         Lab Results   Component Value Date    CREATININE 1.4 02/04/2023     No results found for: UTPCR  Lab Results   Component Value Date     (L) 02/04/2023    K 4.2 02/04/2023    CO2 25 02/04/2023    CL 94 (L) 02/04/2023     Lab Results   Component Value Date    CALCIUM 8.8 02/04/2023    PHOS 4.2 07/05/2022     Lab Results   Component Value Date    HGB 15.4 02/04/2023    WBC 18.61 (H) 02/04/2023    HCT 46.0 02/04/2023      Lab Results   Component Value Date     02/04/2023    BUN 33 (H) 02/04/2023     Lab Results   Component Value Date    LDLCALC 143 (H) 11/17/2014     CT Abdomen pelvis w/ contrast 11/11/21:  "Impression:     Abnormal appearance associated with the posterior aspect of the uncinate process of the pancreas, this may relate to acute pancreatitis however is concerning for a mass lesion, clinical and historical correlation and follow-up to exclude malignancy is recommended as discussed above.     Mild wall thickening along the sigmoid colon without significant pericolonic inflammatory change may relate to concentric muscular hypertrophy of diverticulosis, however the possibility of mild colitis or diverticulitis is to be considered, clinical and historical " "correlation is needed.     Urinary bladder wall thickening may relate to incomplete distention and or chronic change however correlation for UTI/cystitis is needed.     Bilateral perinephric stranding without obstructive uropathy, appears present on multiple prior examinations and although can be seen with UTI/pyelonephritis is thought likely a baseline appearance.     Small cystic lesion of the mesentery on the left appears present on multiple prior examinations dating back to 2013, as discussed above.     Variant renal anatomy with left pelvic kidney.     This report was flagged in Epic as abnormal."    CT renal stone 2/2/23:  "Impression:     1.  Nonspecific bilateral perinephric edema without evidence of hydronephrosis or nephrolithiasis.  Additionally, there is mild urinary bladder wall thickening.  Correlation with urinalysis to exclude infectious process advised.     2.  Left sided pelvic kidney.     3.  Colonic diverticulosis without evidence to suggest acute diverticulitis.     4.  Additional stable findings as above."      CT abdomen w/ contrast 2/4/22:  "There are no pleural effusions.  There is no evidence of a pneumothorax.  No airspace opacity is present.  There is subsegmental atelectasis in the right middle lobe.     The heart is unremarkable.  There is ectasia of the infrarenal abdominal aorta.  There is circumaortic left renal vein.  The celiac trunk, the SMA, and RADHA are within normal limits.  The portal veins and mesenteric veins are within normal limits.  IVC and the remainder of the venous structures are within normal limits.     There is no evidence of lymphadenopathy in the abdomen or pelvis.     The esophagus, stomach, and duodenum are within normal limits.  The small bowel is unremarkable.  The patient is status post appendectomy.  There is colonic diverticula without evidence of acute diverticulitis.  There is no CT evidence of bowel obstruction.     The liver is unremarkable.  The " "gallbladder is within normal limits.  The biliary tree is within normal limits.  The spleen is unremarkable.  There is stable hypoattenuation lesion in the uncinate process of the pancreas with maximum diameter of 2.1 cm.  The pancreas is otherwise within normal limits.  The adrenal glands are unremarkable.     There is stable appearance of simple cyst in the right kidney.  There is no evidence of nephrolithiasis.  There is ectopic location of the left kidney in the left hemipelvis.  There is unchanged dysmorphic appearance of the left kidney with simple appearing cyst in the left kidney.  The ureters are unremarkable.  There is mild wall thickening of the urinary bladder.  The prostate gland is unremarkable.     There is no evidence of free fluid in the pelvis.  There is no evidence of free air.  There is no evidence of pneumatosis.  No portal venous air is identified.     The psoas margins are unremarkable.  The abdominal wall is within normal limits.  There are bilateral fat containing inguinal hernias.  There are degenerative changes in the osseous structures.  There is no evidence of a fracture.     Impression:     No evidence of nephrolithiasis or obstructive uropathy.     Wall thickening of the urinary bladder.  Suggest correlation with urinalysis for possible cystitis.     Additional stable findings as above."       Assessment:       1. Stage 3a chronic kidney disease    2. Pelvic kidney    3. Primary hypertension    4. Pancreatic lesion        Plan:   CKD stage 3a c eGFR 56.5 mL/min -  - Left pelvic kidney   - Baseline Cr ~0.9-1.1, GFR >60, slightly outside of baseline at 1.4  - CKD clinically related to HTN, NSAID use, h/o substance abuse, IV contrast  - Noted recent NSAID use with Toradol, currently maintained on Bactrim for presumed UTI, bladder wall thickening, perinephric edema on imaging  - Several ED visits for right flank/ abdominal pain, no signs of renal stones  - BP remains uncontrolled - " discussed importance of BP control, compliance with antihypertensives, further treatment as below  - Recommend avoiding NSAIDs/ nephrotoxic agents, adequate hydration     UPCR Negative on UA, f/u UPCR, continue RAASI   Acid-base WNL   Renal osteodystrophy Ca and phos WNL   Anemia WNL   DM No history   Lipid Management No statin     HTN   - Uncontrolled, maintained on valsartan-hctz for which he reports makes him tired when he takes whole dose  - Discussed taking valsartan-hctz at night, will start amlodipine 10mg qd  - Instructed to monitor BP at home    Abdominal pain/ pancreatic lesion  - Consistent per CT above  - Refer to GI for further evaluation    All questions patient had were answered.  Asked if further questions. None. F/u in clinic 1 month  with labs and urine prior to next visit or sooner if needed.  ER for emergency concerns.    Summary of Plan:  - Start amlodipine 10mg qd, continue to monitor BP at home  - Refer to GI as above  - 1 Month follow up with standard labs

## 2023-02-08 NOTE — PATIENT INSTRUCTIONS
Avoid cold medications with decongestants  Monitor BP at home, keep log  Maintain proper hydration  Avoid NSAIDs - such ibuprofen, advil, aleve, naproxen

## 2023-02-23 ENCOUNTER — TELEPHONE (OUTPATIENT)
Dept: NEPHROLOGY | Facility: CLINIC | Age: 63
End: 2023-02-23
Payer: MEDICARE

## 2023-03-08 ENCOUNTER — OFFICE VISIT (OUTPATIENT)
Dept: NEPHROLOGY | Facility: CLINIC | Age: 63
End: 2023-03-08
Payer: MEDICARE

## 2023-03-08 VITALS
BODY MASS INDEX: 27.8 KG/M2 | SYSTOLIC BLOOD PRESSURE: 149 MMHG | OXYGEN SATURATION: 98 % | WEIGHT: 152 LBS | DIASTOLIC BLOOD PRESSURE: 82 MMHG | HEART RATE: 75 BPM

## 2023-03-08 DIAGNOSIS — R31.29 MICROHEMATURIA: ICD-10-CM

## 2023-03-08 DIAGNOSIS — K86.9 PANCREATIC LESION: ICD-10-CM

## 2023-03-08 DIAGNOSIS — I10 PRIMARY HYPERTENSION: ICD-10-CM

## 2023-03-08 DIAGNOSIS — N18.1 STAGE 1 CHRONIC KIDNEY DISEASE: Primary | ICD-10-CM

## 2023-03-08 DIAGNOSIS — Q63.2 PELVIC KIDNEY: ICD-10-CM

## 2023-03-08 PROCEDURE — 99214 OFFICE O/P EST MOD 30 MIN: CPT | Mod: S$GLB,,, | Performed by: NURSE PRACTITIONER

## 2023-03-08 PROCEDURE — 99999 PR PBB SHADOW E&M-EST. PATIENT-LVL III: ICD-10-PCS | Mod: PBBFAC,,, | Performed by: NURSE PRACTITIONER

## 2023-03-08 PROCEDURE — 3008F BODY MASS INDEX DOCD: CPT | Mod: CPTII,S$GLB,, | Performed by: NURSE PRACTITIONER

## 2023-03-08 PROCEDURE — 3077F PR MOST RECENT SYSTOLIC BLOOD PRESSURE >= 140 MM HG: ICD-10-PCS | Mod: CPTII,S$GLB,, | Performed by: NURSE PRACTITIONER

## 2023-03-08 PROCEDURE — 3008F PR BODY MASS INDEX (BMI) DOCUMENTED: ICD-10-PCS | Mod: CPTII,S$GLB,, | Performed by: NURSE PRACTITIONER

## 2023-03-08 PROCEDURE — 3066F NEPHROPATHY DOC TX: CPT | Mod: CPTII,S$GLB,, | Performed by: NURSE PRACTITIONER

## 2023-03-08 PROCEDURE — 3077F SYST BP >= 140 MM HG: CPT | Mod: CPTII,S$GLB,, | Performed by: NURSE PRACTITIONER

## 2023-03-08 PROCEDURE — 1159F MED LIST DOCD IN RCRD: CPT | Mod: CPTII,S$GLB,, | Performed by: NURSE PRACTITIONER

## 2023-03-08 PROCEDURE — 99214 PR OFFICE/OUTPT VISIT, EST, LEVL IV, 30-39 MIN: ICD-10-PCS | Mod: S$GLB,,, | Performed by: NURSE PRACTITIONER

## 2023-03-08 PROCEDURE — 3079F PR MOST RECENT DIASTOLIC BLOOD PRESSURE 80-89 MM HG: ICD-10-PCS | Mod: CPTII,S$GLB,, | Performed by: NURSE PRACTITIONER

## 2023-03-08 PROCEDURE — 3066F PR DOCUMENTATION OF TREATMENT FOR NEPHROPATHY: ICD-10-PCS | Mod: CPTII,S$GLB,, | Performed by: NURSE PRACTITIONER

## 2023-03-08 PROCEDURE — 1159F PR MEDICATION LIST DOCUMENTED IN MEDICAL RECORD: ICD-10-PCS | Mod: CPTII,S$GLB,, | Performed by: NURSE PRACTITIONER

## 2023-03-08 PROCEDURE — 99999 PR PBB SHADOW E&M-EST. PATIENT-LVL III: CPT | Mod: PBBFAC,,, | Performed by: NURSE PRACTITIONER

## 2023-03-08 PROCEDURE — 3079F DIAST BP 80-89 MM HG: CPT | Mod: CPTII,S$GLB,, | Performed by: NURSE PRACTITIONER

## 2023-03-08 NOTE — PROGRESS NOTES
Subjective:       Patient ID: Mike Craft is a 63 y.o.  male who presents for new evaluation of kidney stones.      HPI     Patient is new to me. Prior pertinent chart reviewed since this is patient's first appointment with me. Patient presents for evaluation of kidney stones.  Baseline creatinine of 0.9-1.1. Significant other medical problems include HTN, GERD, Craft's esophagus, pancreatitis, HCV, alcoholism, cocaine use. He has had a total of 3 visits to the ED 2/2/23 for right flank pain thought to be associated with kidney stones. Imaging shows no evidence of renal stones. He was treated with IV analgesics, zofran, IV rocephin, and IVF and discharged home on bactrim for presumed UTI and protonix with plans for Urology referral. He presented for a fourth time to the ED for similar pain and constipation. Septic w/u done at this time unremarkable. He received lactulose with relief and left AMA. He reports that he has this right flank/abdominal pain about every 6 months. His BP has been running high as well and he splits his valsartan-HCTZ in half and takes BID. He took a valium prior to his appointment today and thought that might help his BP. He denies further alcohol or cocaine use. The patient denies herbal supplements, recreational drugs, recent episode of dehydration, diarrhea, nausea or vomiting, acute illness.     Significant family hx includes: Brother - kidney disease    Update 3/8/23:  - Presents for follow-up of CKD, BP  - BP better controlled with addition of amlodipine, reports feeling better overall  - The patient denies NSAID use, herbal supplements, recreational drugs, recent episode of dehydration, diarrhea, nausea or vomiting, acute illness.     Review of Systems   Constitutional:  Negative for fever.   Respiratory:  Negative for shortness of breath.    Cardiovascular:  Negative for chest pain and leg swelling.   Gastrointestinal:  Negative for diarrhea, nausea and vomiting.  "  Genitourinary:  Negative for difficulty urinating, dysuria and hematuria.   Neurological:  Negative for syncope.   All other systems reviewed and are negative.    Objective:       Blood pressure (!) 149/82, pulse 75, weight 68.9 kg (152 lb), SpO2 98 %.  Physical Exam  Vitals reviewed.   Constitutional:       General: He is not in acute distress.  Eyes:      General: No scleral icterus.  Cardiovascular:      Rate and Rhythm: Normal rate and regular rhythm.   Pulmonary:      Effort: Pulmonary effort is normal. No respiratory distress.      Breath sounds: No wheezing or rales.   Musculoskeletal:      Right lower leg: No edema.      Left lower leg: No edema.   Skin:     General: Skin is warm and dry.   Neurological:      General: No focal deficit present.      Mental Status: He is alert and oriented to person, place, and time.   Psychiatric:         Mood and Affect: Mood normal.         Behavior: Behavior normal.         Lab Results   Component Value Date    CREATININE 0.9 03/07/2023     Prot/Creat Ratio, Urine   Date Value Ref Range Status   03/07/2023 0.06 0.00 - 0.20 Final     Lab Results   Component Value Date     03/07/2023    K 4.1 03/07/2023    CO2 28 03/07/2023     03/07/2023     Lab Results   Component Value Date    CALCIUM 9.8 03/07/2023    PHOS 2.2 (L) 03/07/2023     Lab Results   Component Value Date    HGB 16.0 03/07/2023    WBC 9.44 03/07/2023    HCT 50.4 03/07/2023      Lab Results   Component Value Date     03/07/2023    BUN 8 03/07/2023     Lab Results   Component Value Date    LDLCALC 143 (H) 11/17/2014     CT Abdomen pelvis w/ contrast 11/11/21:  "Impression:     Abnormal appearance associated with the posterior aspect of the uncinate process of the pancreas, this may relate to acute pancreatitis however is concerning for a mass lesion, clinical and historical correlation and follow-up to exclude malignancy is recommended as discussed above.     Mild wall thickening along the " "sigmoid colon without significant pericolonic inflammatory change may relate to concentric muscular hypertrophy of diverticulosis, however the possibility of mild colitis or diverticulitis is to be considered, clinical and historical correlation is needed.     Urinary bladder wall thickening may relate to incomplete distention and or chronic change however correlation for UTI/cystitis is needed.     Bilateral perinephric stranding without obstructive uropathy, appears present on multiple prior examinations and although can be seen with UTI/pyelonephritis is thought likely a baseline appearance.     Small cystic lesion of the mesentery on the left appears present on multiple prior examinations dating back to 2013, as discussed above.     Variant renal anatomy with left pelvic kidney.     This report was flagged in Epic as abnormal."    CT renal stone 2/2/23:  "Impression:     1.  Nonspecific bilateral perinephric edema without evidence of hydronephrosis or nephrolithiasis.  Additionally, there is mild urinary bladder wall thickening.  Correlation with urinalysis to exclude infectious process advised.     2.  Left sided pelvic kidney.     3.  Colonic diverticulosis without evidence to suggest acute diverticulitis.     4.  Additional stable findings as above."      CT abdomen w/ contrast 2/4/22:  "There are no pleural effusions.  There is no evidence of a pneumothorax.  No airspace opacity is present.  There is subsegmental atelectasis in the right middle lobe.     The heart is unremarkable.  There is ectasia of the infrarenal abdominal aorta.  There is circumaortic left renal vein.  The celiac trunk, the SMA, and RADHA are within normal limits.  The portal veins and mesenteric veins are within normal limits.  IVC and the remainder of the venous structures are within normal limits.     There is no evidence of lymphadenopathy in the abdomen or pelvis.     The esophagus, stomach, and duodenum are within normal limits.  The " "small bowel is unremarkable.  The patient is status post appendectomy.  There is colonic diverticula without evidence of acute diverticulitis.  There is no CT evidence of bowel obstruction.     The liver is unremarkable.  The gallbladder is within normal limits.  The biliary tree is within normal limits.  The spleen is unremarkable.  There is stable hypoattenuation lesion in the uncinate process of the pancreas with maximum diameter of 2.1 cm.  The pancreas is otherwise within normal limits.  The adrenal glands are unremarkable.     There is stable appearance of simple cyst in the right kidney.  There is no evidence of nephrolithiasis.  There is ectopic location of the left kidney in the left hemipelvis.  There is unchanged dysmorphic appearance of the left kidney with simple appearing cyst in the left kidney.  The ureters are unremarkable.  There is mild wall thickening of the urinary bladder.  The prostate gland is unremarkable.     There is no evidence of free fluid in the pelvis.  There is no evidence of free air.  There is no evidence of pneumatosis.  No portal venous air is identified.     The psoas margins are unremarkable.  The abdominal wall is within normal limits.  There are bilateral fat containing inguinal hernias.  There are degenerative changes in the osseous structures.  There is no evidence of a fracture.     Impression:     No evidence of nephrolithiasis or obstructive uropathy.     Wall thickening of the urinary bladder.  Suggest correlation with urinalysis for possible cystitis.     Additional stable findings as above."       Assessment:       1. Stage 1 chronic kidney disease    2. Pelvic kidney    3. Primary hypertension    4. Pancreatic lesion    5. Microhematuria          Plan:   CKD stage 1 c eGFR 96 mL/min -  - Left pelvic kidney   - Baseline Cr ~0.9-1.1, GFR >60  - recent peak at 1.4, likely related to uncontrolled HTN, NSAIDs + bactrim use   - Several ED visits for right flank/ abdominal " pain, no signs of renal stones  - Recommend avoiding NSAIDs/ nephrotoxic agents, adequate hydration, continued BP control    HTN   - Trending down on amlodipine 10mg qd and valsartan-hctz 80-12.5 qhs  - Instructed to monitor BP at home  - Low Na diet    Abdominal pain/ pancreatic lesion  - Per CT above  - Refer to GI for further evaluation    Microhematuria  - UA with 5 RBCs, no recent history if hematuria  - Deniues urinary symptoms  - Repeat UA next week    All questions patient had were answered.  Asked if further questions. None. F/u in clinic as needed  with labs and urine prior to next visit or sooner if needed.  ER for emergency concerns.    Summary of Plan:  - Pending GI evaluation  - Primary care referral to establish care  - Repeat UA 1 week  - Follow-up in our clinic as needed

## 2023-03-16 ENCOUNTER — TELEPHONE (OUTPATIENT)
Dept: NEPHROLOGY | Facility: CLINIC | Age: 63
End: 2023-03-16

## 2023-08-28 DIAGNOSIS — U07.1 COVID-19 VIRUS DETECTED: ICD-10-CM

## 2023-08-29 ENCOUNTER — NURSE TRIAGE (OUTPATIENT)
Dept: ADMINISTRATIVE | Facility: CLINIC | Age: 63
End: 2023-08-29
Payer: MEDICARE

## 2023-08-30 NOTE — TELEPHONE ENCOUNTER
Pt was diagnosed with Covid yesterday in the ED and started paxlovid. He states that he wakes up drenched in sweat but has not had fever that he is aware of. Otherwise, pt's symptoms are improving. He denies any chest pain or SOB. He does note diarrhea since taking the paxlovid.     Care Advice recommends home care at this time. Pt verbalizes understanding and is instructed to call back with any new/worsening sxs, questions, or concerns.   Reason for Disposition   [1] COVID-19 diagnosed by positive lab test (e.g., PCR, rapid self-test kit) AND [2] mild symptoms (e.g., cough, fever, others) AND [3] no complications or SOB    Additional Information   Negative: SEVERE difficulty breathing (e.g., struggling for each breath, speaks in single words)   Negative: Difficult to awaken or acting confused (e.g., disoriented, slurred speech)   Negative: Bluish (or gray) lips or face now   Negative: Shock suspected (e.g., cold/pale/clammy skin, too weak to stand, low BP, rapid pulse)   Negative: Sounds like a life-threatening emergency to the triager   Negative: SEVERE or constant chest pain or pressure  (Exception: Mild central chest pain, present only when coughing.)   Negative: MODERATE difficulty breathing (e.g., speaks in phrases, SOB even at rest, pulse 100-120)   Negative: [1] Headache AND [2] stiff neck (can't touch chin to chest)   Negative: Oxygen level (e.g., pulse oximetry) 90 percent or lower   Negative: Chest pain or pressure  (Exception: MILD central chest pain, present only when coughing.)   Negative: [1] Drinking very little AND [2] dehydration suspected (e.g., no urine > 12 hours, very dry mouth, very lightheaded)   Negative: Patient sounds very sick or weak to the triager   Negative: MILD difficulty breathing (e.g., minimal/no SOB at rest, SOB with walking, pulse <100)   Negative: Fever > 103 F (39.4 C)   Negative: [1] Fever > 101 F (38.3 C) AND [2] age > 60 years   Negative: [1] Fever > 100.0 F (37.8 C) AND  [2] bedridden (e.g., CVA, chronic illness, recovering from surgery)   Negative: [1] HIGH RISK patient (e.g., weak immune system, age > 64 years, obesity with BMI 30 or higher, pregnant, chronic lung disease or other chronic medical condition) AND [2] COVID symptoms (e.g., cough, fever)  (Exceptions: Already seen by PCP and no new or worsening symptoms.)   Negative: [1] HIGH RISK patient AND [2] influenza is widespread in the community AND [3] ONE OR MORE respiratory symptoms: cough, sore throat, runny or stuffy nose   Negative: [1] HIGH RISK patient AND [2] influenza exposure within the last 7 days AND [3] ONE OR MORE respiratory symptoms: cough, sore throat, runny or stuffy nose   Negative: Fever present > 3 days (72 hours)   Negative: [1] Fever returns after gone for over 24 hours AND [2] symptoms worse or not improved   Negative: [1] Continuous (nonstop) coughing interferes with work or school AND [2] no improvement using cough treatment per Care Advice   Negative: [1] COVID-19 infection suspected by caller or triager AND [2] mild symptoms (cough, fever, or others) AND [3] negative COVID-19 rapid test   Negative: Cough present > 3 weeks   Negative: [1] COVID-19 diagnosed by positive lab test (e.g., PCR, rapid self-test kit) AND [2] NO symptoms (e.g., cough, fever, others)    Protocols used: Coronavirus (COVID-19) Diagnosed or [id number]

## 2023-08-31 ENCOUNTER — NURSE TRIAGE (OUTPATIENT)
Dept: ADMINISTRATIVE | Facility: CLINIC | Age: 63
End: 2023-08-31
Payer: MEDICARE

## 2023-08-31 NOTE — TELEPHONE ENCOUNTER
Pt called for c/o mouth tasting bad pt denies any SOB or CP pt dx with covid and he said that he is not running fever but having night sweats. Pt triaged for worst symptom and it was the mouth tasting bad. He said that he is on Paxlovid and he has 1 day left. Pt triaged and said that he is more thirsty than normal and triage is to be seen in office today. Pts pcp no [facility] and pt told to reach out to see what they suggest as far as an appt. Pt said that he will call back if any other questions or concerns. No routing non [facility] pcp                 Reason for Disposition   Dry mouth and urinating more frequently than usual (i.e., frequency)    Additional Information   Negative: SEVERE difficulty breathing (e.g., struggling for each breath, speaks in single words, stridor)   Negative: Sounds like a life-threatening emergency to the triager   Negative: Drooling or spitting out saliva (because can't swallow) and new-onset   Negative: Bleeding from mouth and won't stop after 10 minutes of direct pressure   Negative: Electrical burn of mouth   Negative: Chemical burn of mouth   Negative: Difficulty breathing and not severe   Negative: Patient sounds very sick or weak to the triager    Protocols used: Mouth Symptoms-A-OH

## 2023-11-24 NOTE — PROGRESS NOTES
Subjective:       Patient ID: Mike Craft is a 59 y.o. male.    Chief Complaint: Cough    HPI patient is here with complaint of chronic coughing still smoking to see Dr. costa recently for the same thing and get his cough syrup with codeine and pain medication no fever the no short of breath or chest pain discussed with patient about cough syrup narcotic patient just had it filled patient can be treated for congestion coughing instructed to quit smoking get chest x-ray but patient chest walk out of the office not angry but understand  Review of Systems    Objective:      Physical Exam    Assessment:       1. Upper respiratory tract infection, unspecified type    2. Cough        Plan:       Upper respiratory tract infection, unspecified type    Cough         No. HIRA screening performed.  STOP BANG Legend: 0-2 = LOW Risk; 3-4 = INTERMEDIATE Risk; 5-8 = HIGH Risk

## 2024-02-10 PROBLEM — R07.9 CHEST PAIN: Status: ACTIVE | Noted: 2024-02-10

## 2024-02-10 PROBLEM — R07.9 CHEST PAIN: Status: RESOLVED | Noted: 2024-02-10 | Resolved: 2024-02-10

## 2024-02-10 PROBLEM — R79.89 ELEVATED TROPONIN: Status: ACTIVE | Noted: 2024-02-10

## 2024-02-12 ENCOUNTER — WALK IN (OUTPATIENT)
Dept: URGENT CARE | Age: 64
End: 2024-02-12
Attending: EMERGENCY MEDICINE

## 2024-02-12 VITALS
DIASTOLIC BLOOD PRESSURE: 71 MMHG | HEART RATE: 83 BPM | OXYGEN SATURATION: 97 % | TEMPERATURE: 97.7 F | BODY MASS INDEX: 30.31 KG/M2 | RESPIRATION RATE: 18 BRPM | HEIGHT: 68 IN | SYSTOLIC BLOOD PRESSURE: 109 MMHG | WEIGHT: 200 LBS

## 2024-02-12 DIAGNOSIS — J01.00 ACUTE MAXILLARY SINUSITIS, RECURRENCE NOT SPECIFIED: Primary | ICD-10-CM

## 2024-02-12 RX ORDER — LOSARTAN POTASSIUM 100 MG/1
100 TABLET ORAL DAILY
COMMUNITY

## 2024-02-12 RX ORDER — AMOXICILLIN AND CLAVULANATE POTASSIUM 875; 125 MG/1; MG/1
1 TABLET, FILM COATED ORAL EVERY 12 HOURS
Qty: 14 TABLET | Refills: 0 | Status: SHIPPED | OUTPATIENT
Start: 2024-02-12 | End: 2024-02-19

## 2024-02-12 RX ORDER — FLUTICASONE PROPIONATE 50 MCG
1 SPRAY, SUSPENSION (ML) NASAL DAILY
Qty: 1 EACH | Refills: 0 | Status: SHIPPED | OUTPATIENT
Start: 2024-02-12 | End: 2024-02-19

## 2024-02-12 RX ORDER — CARVEDILOL 6.25 MG/1
12.5 TABLET ORAL 2 TIMES DAILY
COMMUNITY

## 2024-02-12 RX ORDER — SPIRONOLACTONE 25 MG/1
12.5 TABLET ORAL DAILY
COMMUNITY
Start: 2023-12-21

## 2024-02-12 RX ORDER — ATORVASTATIN CALCIUM 40 MG/1
40 TABLET, FILM COATED ORAL DAILY
COMMUNITY
Start: 2023-11-01

## 2024-02-12 ASSESSMENT — PAIN SCALES - GENERAL: PAINLEVEL: 8

## 2024-08-08 ENCOUNTER — TELEPHONE (OUTPATIENT)
Dept: PULMONOLOGY | Facility: CLINIC | Age: 64
End: 2024-08-08
Payer: MEDICARE

## 2024-08-08 DIAGNOSIS — F17.200 NICOTINE DEPENDENCE: Primary | ICD-10-CM

## 2024-10-01 ENCOUNTER — TELEPHONE (OUTPATIENT)
Dept: PULMONOLOGY | Facility: CLINIC | Age: 64
End: 2024-10-01
Payer: MEDICARE

## 2024-10-08 ENCOUNTER — TELEPHONE (OUTPATIENT)
Dept: PULMONOLOGY | Facility: CLINIC | Age: 64
End: 2024-10-08
Payer: MEDICARE

## 2024-10-16 ENCOUNTER — TELEPHONE (OUTPATIENT)
Dept: PULMONOLOGY | Facility: CLINIC | Age: 64
End: 2024-10-16
Payer: MEDICARE